# Patient Record
Sex: FEMALE
[De-identification: names, ages, dates, MRNs, and addresses within clinical notes are randomized per-mention and may not be internally consistent; named-entity substitution may affect disease eponyms.]

---

## 2021-05-18 ENCOUNTER — NON-APPOINTMENT (OUTPATIENT)
Age: 21
End: 2021-05-18

## 2021-05-18 ENCOUNTER — APPOINTMENT (OUTPATIENT)
Dept: OPHTHALMOLOGY | Facility: CLINIC | Age: 21
End: 2021-05-18
Payer: COMMERCIAL

## 2021-05-18 PROCEDURE — 99072 ADDL SUPL MATRL&STAF TM PHE: CPT

## 2021-05-18 PROCEDURE — 92004 COMPRE OPH EXAM NEW PT 1/>: CPT

## 2021-05-20 ENCOUNTER — EMERGENCY (EMERGENCY)
Facility: HOSPITAL | Age: 21
LOS: 1 days | Discharge: HOME | End: 2021-05-20
Attending: EMERGENCY MEDICINE
Payer: COMMERCIAL

## 2021-05-20 ENCOUNTER — APPOINTMENT (OUTPATIENT)
Dept: OPHTHALMOLOGY | Facility: CLINIC | Age: 21
End: 2021-05-20
Payer: COMMERCIAL

## 2021-05-20 ENCOUNTER — NON-APPOINTMENT (OUTPATIENT)
Age: 21
End: 2021-05-20

## 2021-05-20 VITALS
HEART RATE: 75 BPM | TEMPERATURE: 98 F | DIASTOLIC BLOOD PRESSURE: 78 MMHG | OXYGEN SATURATION: 100 % | SYSTOLIC BLOOD PRESSURE: 112 MMHG | RESPIRATION RATE: 15 BRPM | HEIGHT: 68 IN | WEIGHT: 220.02 LBS

## 2021-05-20 DIAGNOSIS — Z98.890 OTHER SPECIFIED POSTPROCEDURAL STATES: Chronic | ICD-10-CM

## 2021-05-20 PROBLEM — Z00.00 ENCOUNTER FOR PREVENTIVE HEALTH EXAMINATION: Status: ACTIVE | Noted: 2021-05-20

## 2021-05-20 LAB
ALBUMIN SERPL ELPH-MCNC: 4.5 G/DL — SIGNIFICANT CHANGE UP (ref 3.3–5)
ALP SERPL-CCNC: 106 U/L — SIGNIFICANT CHANGE UP (ref 40–120)
ALT FLD-CCNC: 12 U/L — SIGNIFICANT CHANGE UP (ref 10–45)
ANION GAP SERPL CALC-SCNC: 13 MMOL/L — SIGNIFICANT CHANGE UP (ref 5–17)
AST SERPL-CCNC: 15 U/L — SIGNIFICANT CHANGE UP (ref 10–40)
BASOPHILS # BLD AUTO: 0.05 K/UL — SIGNIFICANT CHANGE UP (ref 0–0.2)
BASOPHILS NFR BLD AUTO: 0.5 % — SIGNIFICANT CHANGE UP (ref 0–2)
BILIRUB SERPL-MCNC: 0.4 MG/DL — SIGNIFICANT CHANGE UP (ref 0.2–1.2)
BUN SERPL-MCNC: 12 MG/DL — SIGNIFICANT CHANGE UP (ref 7–23)
CALCIUM SERPL-MCNC: 9.8 MG/DL — SIGNIFICANT CHANGE UP (ref 8.4–10.5)
CHLORIDE SERPL-SCNC: 102 MMOL/L — SIGNIFICANT CHANGE UP (ref 96–108)
CO2 SERPL-SCNC: 22 MMOL/L — SIGNIFICANT CHANGE UP (ref 22–31)
CREAT SERPL-MCNC: 0.62 MG/DL — SIGNIFICANT CHANGE UP (ref 0.5–1.3)
EOSINOPHIL # BLD AUTO: 0.08 K/UL — SIGNIFICANT CHANGE UP (ref 0–0.5)
EOSINOPHIL NFR BLD AUTO: 0.7 % — SIGNIFICANT CHANGE UP (ref 0–6)
GLUCOSE SERPL-MCNC: 84 MG/DL — SIGNIFICANT CHANGE UP (ref 70–99)
HCG SERPL-ACNC: <2 MIU/ML — SIGNIFICANT CHANGE UP
HCT VFR BLD CALC: 42 % — SIGNIFICANT CHANGE UP (ref 34.5–45)
HGB BLD-MCNC: 14.1 G/DL — SIGNIFICANT CHANGE UP (ref 11.5–15.5)
IMM GRANULOCYTES NFR BLD AUTO: 0.4 % — SIGNIFICANT CHANGE UP (ref 0–1.5)
LYMPHOCYTES # BLD AUTO: 2.41 K/UL — SIGNIFICANT CHANGE UP (ref 1–3.3)
LYMPHOCYTES # BLD AUTO: 22 % — SIGNIFICANT CHANGE UP (ref 13–44)
MCHC RBC-ENTMCNC: 29.3 PG — SIGNIFICANT CHANGE UP (ref 27–34)
MCHC RBC-ENTMCNC: 33.6 GM/DL — SIGNIFICANT CHANGE UP (ref 32–36)
MCV RBC AUTO: 87.3 FL — SIGNIFICANT CHANGE UP (ref 80–100)
MONOCYTES # BLD AUTO: 0.63 K/UL — SIGNIFICANT CHANGE UP (ref 0–0.9)
MONOCYTES NFR BLD AUTO: 5.8 % — SIGNIFICANT CHANGE UP (ref 2–14)
NEUTROPHILS # BLD AUTO: 7.72 K/UL — HIGH (ref 1.8–7.4)
NEUTROPHILS NFR BLD AUTO: 70.6 % — SIGNIFICANT CHANGE UP (ref 43–77)
NRBC # BLD: 0 /100 WBCS — SIGNIFICANT CHANGE UP (ref 0–0)
PLATELET # BLD AUTO: 343 K/UL — SIGNIFICANT CHANGE UP (ref 150–400)
POTASSIUM SERPL-MCNC: 3.8 MMOL/L — SIGNIFICANT CHANGE UP (ref 3.5–5.3)
POTASSIUM SERPL-SCNC: 3.8 MMOL/L — SIGNIFICANT CHANGE UP (ref 3.5–5.3)
PROT SERPL-MCNC: 8.1 G/DL — SIGNIFICANT CHANGE UP (ref 6–8.3)
RBC # BLD: 4.81 M/UL — SIGNIFICANT CHANGE UP (ref 3.8–5.2)
RBC # FLD: 12.6 % — SIGNIFICANT CHANGE UP (ref 10.3–14.5)
SARS-COV-2 RNA SPEC QL NAA+PROBE: SIGNIFICANT CHANGE UP
SODIUM SERPL-SCNC: 137 MMOL/L — SIGNIFICANT CHANGE UP (ref 135–145)
WBC # BLD: 10.93 K/UL — HIGH (ref 3.8–10.5)
WBC # FLD AUTO: 10.93 K/UL — HIGH (ref 3.8–10.5)

## 2021-05-20 PROCEDURE — 99072 ADDL SUPL MATRL&STAF TM PHE: CPT

## 2021-05-20 PROCEDURE — 99215 OFFICE O/P EST HI 40 MIN: CPT

## 2021-05-20 PROCEDURE — 92133 CPTRZD OPH DX IMG PST SGM ON: CPT

## 2021-05-20 PROCEDURE — 99218: CPT

## 2021-05-20 PROCEDURE — 70450 CT HEAD/BRAIN W/O DYE: CPT | Mod: 26,MA

## 2021-05-20 PROCEDURE — 62270 DX LMBR SPI PNXR: CPT

## 2021-05-20 PROCEDURE — 92083 EXTENDED VISUAL FIELD XM: CPT

## 2021-05-20 RX ORDER — ACETAMINOPHEN 500 MG
650 TABLET ORAL ONCE
Refills: 0 | Status: COMPLETED | OUTPATIENT
Start: 2021-05-20 | End: 2021-05-20

## 2021-05-20 RX ADMIN — Medication 650 MILLIGRAM(S): at 21:18

## 2021-05-20 NOTE — ED CDU PROVIDER INITIAL DAY NOTE - PHYSICAL EXAMINATION
PHYSICAL EXAM:  	GENERAL: non-toxic appearing; in no respiratory distress  	HEAD Atraumatic, Normocephalic  	NECK: No JVD; FROM  	EYES:  EOMs intact b/l w/out deficits  	CHEST/LUNG: CTAB no wheezes/rhonchi/rales  	HEART: RRR no murmur/gallops/rubs  	ABDOMEN: +BS, soft, NT, ND  	EXTREMITIES: No LE edema, +2 radial pulses b/l  	MUSCULOSKELETAL: FROM of all 4 extremities  	NERVOUS SYSTEM:  A&Ox3, strength equal b/l UE and LE 5/5, sensation equal b/l UE and LE  PSYCH: Appropriate mood and affect

## 2021-05-20 NOTE — ED ADULT NURSE REASSESSMENT NOTE - NS ED NURSE REASSESS COMMENT FT1
Received report from Zulay CHRISTIANSON. Patient resting comfortably in stretcher. A&Ox4. Patient in no acute distress. Patient pending results of COVID swab and transport to CDU. Plan of care discussed. Safety and comfort measures maintained.

## 2021-05-20 NOTE — ED CDU PROVIDER INITIAL DAY NOTE - DETAILS
Papilledema   -neuro checks q4  -No LP at this time  -opthalmology following  -neuro to see after MRI  -MRI/MRV  -DW Arata, vitals every 4 hours, frequent reevaluations

## 2021-05-20 NOTE — ED CDU PROVIDER INITIAL DAY NOTE - NS ED ROS FT
Constitutional: No fever or chills  Eyes: +visual changes +photophobia   CV: No chest pain or lower extremity edema  Resp: No SOB no cough  GI: No abd pain. No nausea or vomiting. No diarrhea.   : No dysuria, hematuria.   MSK: No musculoskeletal pain  Skin: No rash  Psych: No complaints   Neuro: + headache. No numbness or tingling. No weakness.

## 2021-05-20 NOTE — ED PROVIDER NOTE - ATTENDING CONTRIBUTION TO CARE
Attending MD Haider: 21F with PMH/PSH including "surgery on my septum" ?septoplasty in 1/20/21 presents to the ED sent in by neuro ophthalmologist, Dr. Bernard, for IIH work up.  Reports that she has had almost a year of headaches.  Reports has had headaches for about 9-10 months.  Reports had surgery for septum in January thinking that part of the headache was coming from sinus pressure and chronic sinus issues.  Reports that Attending MD Haider: I personally have seen and examined this patient.  Resident note reviewed and agree on plan of care and except where noted.  See below for details.     Seen in [facility]    21F with PMH/PSH including "surgery on my septum" ?septoplasty in 1/2021, anemia, on Wellbutrin presents to the ED sent in by neuro ophthalmologist, Dr. Bernard, for IIH work up.  Reports that she has had almost a year of headaches.  Reports has had headaches for about 9-10 months.  Reports had surgery for septum in January thinking that part of the headache was coming from sinus pressure and chronic sinus issues.  Reports that after surgery felt some relief transiently.  Reports that the headache is diffuse but feels like her eyes are going to "pop out".  Reports headache is unchanged from what it has been over ~9 months.  Reports smokes THC which she reports helps but does not resolve headache.  Denies change in hearing, tinnitus, whooshing.  Denies pain with chewing.  Reports was seen by one ophthalmologist and told she had drusen and then saw a neuro ophthalmologist today who sent her in to the ED for papilledema.  Review of Ophtho notes reveals patient seen today by Dr. Bernard, patient sent to the ED for "does have some positive ROS for IIH; also overweight.  Will evaluate with MRI/MRV and LP for OP."  Denies acute vision loss.  Denies chest pain, shortness of breath, palpitations. Denies abdominal pain, nausea, vomiting, diarrhea, blood in stools. Denies numbness, weakness or tingling in extremities. Denies loss of urinary or bowel continence. Denies gait changes or difficulties.  A ten (10) point review of systems was negative other than as stated in the HPI or elsewhere in the chart.     Exam:   General: NAD  HENT: head NCAT, airway patent with moist mucous membranes  Eyes: pupils equal, dilated, round, no APD, EOMI, confrontational VF full, Va sc OD   OS  , no periorbital ecchymosis, no tenderness to palpation of orbital rim, lid margins clear, no retained foreign body on lid eversion, no conjunctival injection, no corneal defect, fundus exam limited, +papilledema OU  Lungs: lungs CTAB with good inspiratory effort, no wheezing, no rhonchi, no rales  Cardiac: +S1S2, no m/r/g  GI: abdomen soft with +BS, NT, ND  : no CVAT  MSK: FROM at neck, no tenderness to midline palpation, no stepoffs along length of spine, no calf tenderness, swelling, erythema or warmth  Neuro: CN 2-12 grossly intact, moving all extremities with 5/5 strength bilateral upper and lower extremities, sensory grossly intact, no gross neuro deficits  Psych: normal mood and affect     A/P: 21F with papilledema OU in setting of headache for 9 months, will discuss with ophthalmology, will obtain CT head to rule out mass effect, will obtain labs for metabolic derangement, will COVID swab

## 2021-05-20 NOTE — ED CDU PROVIDER INITIAL DAY NOTE - ATTENDING CONTRIBUTION TO CARE
Attending MD Haider:   I personally have seen and examined this patient.  Physician assistant note reviewed and agree on plan of care and except where noted.  See below for details.     Seen in [facility] 50    21F with PMH/PSH including "surgery on my septum" ?septoplasty in 1/2021, anemia, on Wellbutrin presents to the ED sent in by neuro ophthalmologist, Dr. Bernard, for IIH work up.  Reports that she has had almost a year of headaches.  Reports has had headaches for about 9-10 months.  Reports had surgery for septum in January thinking that part of the headache was coming from sinus pressure and chronic sinus issues.  Reports that after surgery felt some relief transiently.  Reports that the headache is diffuse but feels like her eyes are going to "pop out".  Reports headache is unchanged from what it has been over ~9 months.  Reports smokes THC which she reports helps but does not resolve headache.  Denies change in hearing, tinnitus, whooshing.  Denies pain with chewing.  Reports was seen by one ophthalmologist and told she had drusen and then saw a neuro ophthalmologist today who sent her in to the ED for papilledema.  Review of Ophtho notes reveals patient seen today by Dr. Bernard, patient sent to the ED for "does have some positive ROS for IIH; also overweight.  Will evaluate with MRI/MRV and LP for OP."  Denies acute vision loss.  Denies chest pain, shortness of breath, palpitations. Denies abdominal pain, nausea, vomiting, diarrhea, blood in stools. Denies numbness, weakness or tingling in extremities. Denies loss of urinary or bowel continence. Denies gait changes or difficulties.  A ten (10) point review of systems was negative other than as stated in the HPI or elsewhere in the chart.     Exam:   General: NAD  HENT: head NCAT, airway patent with moist mucous membranes  Eyes: pupils equal, dilated, round, no APD, EOMI, confrontational VF full, Va sc OD   OS  , no periorbital ecchymosis, no tenderness to palpation of orbital rim, lid margins clear, no retained foreign body on lid eversion, no conjunctival injection, no corneal defect, fundus exam limited, +papilledema OU  Lungs: lungs CTAB with good inspiratory effort, no wheezing, no rhonchi, no rales  Cardiac: +S1S2, no m/r/g  GI: abdomen soft with +BS, NT, ND  : no CVAT  MSK: FROM at neck, no tenderness to midline palpation, no stepoffs along length of spine, no calf tenderness, swelling, erythema or warmth  Neuro: CN 2-12 grossly intact, moving all extremities with 5/5 strength bilateral upper and lower extremities, sensory grossly intact, no gross neuro deficits  Psych: normal mood and affect     A/P: 21F with papilledema OU in setting of headache for 9 months, MRI/MRV, neuro checks, +/- neuro

## 2021-05-20 NOTE — CHART NOTE - NSCHARTNOTEFT_GEN_A_CORE
Pt sent by Dr. Jessica Bernard ([facility] Neuro-ophthalmology) to [facility] ER for MRI/MRV and Lumbar Puncture measurement of Opening Pressure to evaluation for papilledema noted in both eyes on exam today. Relevant information from her outpatient note copied below:      HPI: Reports she has had headaches since high school but increased in the  past 8 months, tension type with retro orbital pain; denies any nausea, vomiting, does have phonophobia and  photo- phobia. Denies any blurry vision or TVOs, no ringing or whooshing in her ears. Denies any weight gain  (she is overweight though), new medications, face creams, wash or lotions. No medications for acne or  antibiotics    VA OD: Dcc20/20. OS: Dcc20/25+2.  IOP: iCare OD: 22 OS: 23 2:19 PM  AR Dry  Sph Cyl [place] PD  OD -2.75 +1.00 090 63mm  OS -3.00 +0.75 078  Dilation:  Location: OU. Tech: JESSICA BERNARD. Time: 2:34 PM. Drops: Tropicamide 1%; Proparacaine 0.5%.  Keratometry:  OD: K1 42.75 @ 178 K2 43.75 @ 88 Asti OS: K1 43.25 @ 170 K2 44.25 @ 80 Asti  Color Plates: Findings OD: 15 out of 15 Plates Correct. Findings OS: 15 out of 15 Plates Correct.     Posterior Right Eye Left Eye  • General  • Nerve CDR <0.1. disc elevation vs edema nasally  OS>OD.  CDR <0.1. disc elevation vs edema nasally  OS>OD.  • Vitreous Clear. Clear.  • Retinal Vessels Normal. Normal.  • Macula Normal Macula. Normal Macula.     HVF 24-2: Findings OD: Reason For Testing: Initial Evaluation. Likely Full. Low False Positives/Negatives. Good  Reliability. Good Fixation. Findings OS: Reason For Testing: Initial Evaluation. Likely Full. Low False  Positives/Negatives. Good Reliability. Good Fixation.  OCT RNFL: Findings OD: Reason for Testing: Guide Treatment. Norm      Imp/Plan:  1. Papilledema OU. Does have some Positive ROS for IIH; also overweight. Will evaluate with MRI/MRV and LP for  OP. I advised patient to report to [facility] ER for further evaluation    Dr. Jessica Bernard  [address] [place] [address]  [phone]  or   600 [facility] [address]  [place] [address]       Ankur Pinon, PGY-III  JUAN MANUEL Pager: [phone] - please page full 10 digit number  [facility] Pager: [phone] - please page full 10 digit number  Available on Microsoft Teams for HIPAA compliant messaging Pt sent by Dr. Jessica Bernard ([facility] Neuro-ophthalmology) to [facility] ER for MRI/MRV and Lumbar Puncture measurement of Opening Pressure in addition to sending CSF studies to evaluate for papilledema noted in both eyes on exam today. Relevant information from her outpatient note copied below:      HPI: Reports she has had headaches since high school but increased in the  past 8 months, tension type with retro orbital pain; denies any nausea, vomiting, does have phonophobia and  photo- phobia. Denies any blurry vision or TVOs, no ringing or whooshing in her ears. Denies any weight gain  (she is overweight though), new medications, face creams, wash or lotions. No medications for acne or  antibiotics    VA OD: Dcc20/20. OS: Dcc20/25+2.  IOP: iCare OD: 22 OS: 23 2:19 PM  AR Dry  Sph Cyl [place] PD  OD -2.75 +1.00 090 63mm  OS -3.00 +0.75 078  Dilation:  Location: OU. Tech: JESSICA BERNARD. Time: 2:34 PM. Drops: Tropicamide 1%; Proparacaine 0.5%.  Keratometry:  OD: K1 42.75 @ 178 K2 43.75 @ 88 Asti OS: K1 43.25 @ 170 K2 44.25 @ 80 Asti  Color Plates: Findings OD: 15 out of 15 Plates Correct. Findings OS: 15 out of 15 Plates Correct.     Posterior Right Eye Left Eye  • General  • Nerve CDR <0.1. disc elevation vs edema nasally  OS>OD.  CDR <0.1. disc elevation vs edema nasally  OS>OD.  • Vitreous Clear. Clear.  • Retinal Vessels Normal. Normal.  • Macula Normal Macula. Normal Macula.     HVF 24-2: Findings OD: Reason For Testing: Initial Evaluation. Likely Full. Low False Positives/Negatives. Good  Reliability. Good Fixation. Findings OS: Reason For Testing: Initial Evaluation. Likely Full. Low False  Positives/Negatives. Good Reliability. Good Fixation.  OCT RNFL: Findings OD: Reason for Testing: Guide Treatment. Norm      Imp/Plan:  1. Papilledema OU. Does have some Positive ROS for IIH; also overweight. Will evaluate with MRI/MRV and LP for  OP/CSF studies. I advised patient to report to [facility] ER for further evaluation    Dr. Jessica Bernard  [address] [place] [address], [place] [address]  [phone]  or   [address] [address]       Ankur Pinon, PGY-III  JUAN MANUEL Pager: [phone] - please page full 10 digit number  [facility] Pager: [phone] - please page full 10 digit number  Available on Microsoft Teams for HIPAA compliant messaging

## 2021-05-20 NOTE — ED CDU PROVIDER INITIAL DAY NOTE - OBJECTIVE STATEMENT
20 yo F with PMH ?septoplasty in 1/2021, dysgraphia, obese, presents to the ED c/o about 10-11 months of worsening headaches. States that she has had headaches since high school but they have been becoming worse. Headaches are described as pain behind her eyes b/l.  States that 2 days ago she woke up and due to photophobia, covered her eyes with a tube sock. Afterwards she had an episode of blurred vision which resolved spontaneously. Pt saw a neuro-opthalmologist today and it was found that she had papilledema and was sent to ED for further eval. Pt states the headache has not changed in character. She denies loss of vision or eye pain. Denies n/v/d, abd pain, cp, fever, neck pain. Former tobacco smoker. Occasional marijuana use. No PMD. No neurologist.   ED course: CT head negative for acute pathology. LP discussed and considered although was not found to be needed emergently. Plan for MRI/MRV in CDU. Opthalmology following. Neuro consult pending MRI/MRV. 22 yo F with PMH ?septoplasty in 1/2021, dysgraphia, obese, presents to the ED c/o about 10-11 months of worsening headaches and blurry vision. States that she has had headaches since high school but they have been becoming worse. Headaches are described as pain behind her eyes b/l. She also says that she has had blurry vision for months. States that 2 days ago she woke up and due to photophobia, covered her eyes with a tube sock. Afterwards she had an episode where she says that she couldn't seen, resolving on its own. Pt saw a neuro-opthalmologist today and it was found that she had papilledema and was sent to ED for further eval. Pt states the headache has not changed in character. She denies loss of vision or eye pain. Denies n/v/d, abd pain, cp, fever, neck pain. Former tobacco smoker. Occasional marijuana use. No PMD. No neurologist.   ED course: CT head negative for acute pathology. LP discussed and considered although was not found to be needed emergently. Plan for MRI/MRV in CDU. Opthalmology following. Neuro consult pending MRI/MRV.

## 2021-05-20 NOTE — ED PROVIDER NOTE - OBJECTIVE STATEMENT
22 yo F with nosignificant PMHx, obese, presents to the ED c/o about 10-11 months of headaches, blurred vision, and photophobia. About 2 days ago, due to photophobia, pt covered her eyes with a tube sock and afterwards, had an episode of blurred vision which resolved spontaneously. Pt saw a neuro-opthalmologist today and it was found that she had papilledema and was sent to ED for further eval. Pt states the headache has not changed in character. She denies loss of vision or eye pain. Denies n/v/d, abd pain, cp, fever, neck pain.

## 2021-05-20 NOTE — ED PROVIDER NOTE - PROGRESS NOTE DETAILS
Attending MD Haider: Spoke with ophthalmology, explained that these symptoms have been going on for 9 months and vision 20/20 and 20/25 right now.  Requested MRI/MRV to rule out dural sinus thrombosis and LP.  Will consult neuro for LP as not emergent with persistent symptoms for 9 months.  Gave resident direct attending number to discuss with Dr. Bernard.  Neuro paged. Attending MD Haider: Spoke with Marco Antonio, explained above.  Reports difficult to schedule an LP as an outpatient.  Offered IR number, reports has attempted in past with difficulty.  Explained that today patient will have CT head and will be sent to CDU for MRI/MRV but no emergent LP at this time. Attending MD Haider: Spoke with neuro, reports can be called after MRI/MRV results but not available for non emergent LP overnight. Attending MD Haider: Results and plan discussed with patient, amenable.  Will send to CDU for MRI/MRV.  Once MRs read, follow up with neuro.

## 2021-05-20 NOTE — ED PROVIDER NOTE - PHYSICAL EXAMINATION
PHYSICAL EXAM:  GENERAL: non-toxic appearing; in no respiratory distress  HEAD Atraumatic, Normocephalic  NECK: No JVD; FROM  EYES: PERRL, EOMs intact b/l w/out deficits; normal conjunctiva; b/l papilledema   CHEST/LUNG: CTAB no wheezes/rhonchi/rales  HEART: RRR no murmur/gallops/rubs  ABDOMEN: +BS, soft, NT, ND  EXTREMITIES: No LE edema, +2 radial pulses b/l, +2 DP/PT pulses b/l  MUSCULOSKELETAL: FROM of all 4 extremities  NERVOUS SYSTEM:  A&Ox3, No motor deficits or sensory deficits; CNII-XII intact; no focal neurologic deficits  SKIN:  No new rashes PHYSICAL EXAM:  GENERAL: non-toxic appearing; in no respiratory distress  HEAD Atraumatic, Normocephalic  NECK: No JVD; FROM  EYES: PERRL, EOMs intact b/l w/out deficits; normal conjunctiva; b/l papilledema; b/l visual acuity 20/20  CHEST/LUNG: CTAB no wheezes/rhonchi/rales  HEART: RRR no murmur/gallops/rubs  ABDOMEN: +BS, soft, NT, ND  EXTREMITIES: No LE edema, +2 radial pulses b/l, +2 DP/PT pulses b/l  MUSCULOSKELETAL: FROM of all 4 extremities  NERVOUS SYSTEM:  A&Ox3, No motor deficits or sensory deficits; CNII-XII intact; no focal neurologic deficits  SKIN:  No new rashes

## 2021-05-20 NOTE — ED PROVIDER NOTE - NS ED ROS FT
Constitutional: no fevers; no chills  HEENT: visual changes, no sore throat, no rhinorrhea  CV: no cp; no palpitations  Resp: no sob; no cough  GI: no abd pain, no nausea, no vomiting, no diarrhea, no constipation  : no dysuria, no hematuria  MSK: no myalgais; no arthralgias  skin: no rashes  neuro: HA, no numbness; no weakness, no tingling  ROS statement: all other ROS negative except as per HPI

## 2021-05-20 NOTE — ED PROVIDER NOTE - CLINICAL SUMMARY MEDICAL DECISION MAKING FREE TEXT BOX
Logan Hill MD. pt with 10-11 months of headache, blurred vision, photophobia, unchanged in nature except that it acutely worsened and improved 2 days ago. pt saw neuro-ophthalmologist and was evidence of papilledema and was sent to ED for further eval. pt neurologically intact at this time altho there is papiledema. will discuss w/ opthho, reassess.

## 2021-05-20 NOTE — ED ADULT NURSE NOTE - OBJECTIVE STATEMENT
pt here for papiledema.  she went to a md today and was referred to the er.  c/o headache and had visual changes where her vision was  "dark"  neuro is without deficit pt here for papiledema.  she went to a md today and was referred to the er.  c/o headache and had visual changes where her vision was  "dark"  neuro is without deficit  pts pupils are dilated by md appointment today

## 2021-05-21 VITALS
HEART RATE: 98 BPM | TEMPERATURE: 98 F | RESPIRATION RATE: 18 BRPM | OXYGEN SATURATION: 97 % | SYSTOLIC BLOOD PRESSURE: 107 MMHG | DIASTOLIC BLOOD PRESSURE: 73 MMHG

## 2021-05-21 LAB
APPEARANCE CSF: CLEAR — SIGNIFICANT CHANGE UP
COLOR CSF: SIGNIFICANT CHANGE UP
GLUCOSE CSF-MCNC: 57 MG/DL — SIGNIFICANT CHANGE UP (ref 40–70)
GRAM STN FLD: SIGNIFICANT CHANGE UP
INR BLD: 1.08 RATIO — SIGNIFICANT CHANGE UP (ref 0.88–1.16)
NEUTROPHILS # CSF: SIGNIFICANT CHANGE UP % (ref 0–6)
NRBC NFR CSF: <1 — SIGNIFICANT CHANGE UP (ref 0–5)
PROT CSF-MCNC: 23 MG/DL — SIGNIFICANT CHANGE UP (ref 15–45)
PROTHROM AB SERPL-ACNC: 12.9 SEC — SIGNIFICANT CHANGE UP (ref 10.6–13.6)
RBC # CSF: 0 /UL — SIGNIFICANT CHANGE UP (ref 0–0)
SPECIMEN SOURCE: SIGNIFICANT CHANGE UP
TUBE TYPE: SIGNIFICANT CHANGE UP

## 2021-05-21 PROCEDURE — 85025 COMPLETE CBC W/AUTO DIFF WBC: CPT

## 2021-05-21 PROCEDURE — 84157 ASSAY OF PROTEIN OTHER: CPT

## 2021-05-21 PROCEDURE — 62328 DX LMBR SPI PNXR W/FLUOR/CT: CPT

## 2021-05-21 PROCEDURE — 84702 CHORIONIC GONADOTROPIN TEST: CPT

## 2021-05-21 PROCEDURE — 89051 BODY FLUID CELL COUNT: CPT

## 2021-05-21 PROCEDURE — 87205 SMEAR GRAM STAIN: CPT

## 2021-05-21 PROCEDURE — A9585: CPT

## 2021-05-21 PROCEDURE — G0378: CPT

## 2021-05-21 PROCEDURE — 99217: CPT

## 2021-05-21 PROCEDURE — 80053 COMPREHEN METABOLIC PANEL: CPT

## 2021-05-21 PROCEDURE — 82945 GLUCOSE OTHER FLUID: CPT

## 2021-05-21 PROCEDURE — U0003: CPT

## 2021-05-21 PROCEDURE — 70546 MR ANGIOGRAPH HEAD W/O&W/DYE: CPT | Mod: 26

## 2021-05-21 PROCEDURE — U0005: CPT

## 2021-05-21 PROCEDURE — 70551 MRI BRAIN STEM W/O DYE: CPT | Mod: 26,MA,59

## 2021-05-21 PROCEDURE — 99284 EMERGENCY DEPT VISIT MOD MDM: CPT | Mod: GC

## 2021-05-21 PROCEDURE — 87070 CULTURE OTHR SPECIMN AEROBIC: CPT

## 2021-05-21 PROCEDURE — 70551 MRI BRAIN STEM W/O DYE: CPT

## 2021-05-21 PROCEDURE — 70450 CT HEAD/BRAIN W/O DYE: CPT

## 2021-05-21 PROCEDURE — 70545 MR ANGIOGRAPHY HEAD W/DYE: CPT

## 2021-05-21 PROCEDURE — 85610 PROTHROMBIN TIME: CPT

## 2021-05-21 PROCEDURE — 70546 MR ANGIOGRAPH HEAD W/O&W/DYE: CPT

## 2021-05-21 PROCEDURE — 96374 THER/PROPH/DIAG INJ IV PUSH: CPT | Mod: XU

## 2021-05-21 PROCEDURE — 99284 EMERGENCY DEPT VISIT MOD MDM: CPT | Mod: 25

## 2021-05-21 RX ORDER — DIAZEPAM 5 MG
5 TABLET ORAL ONCE
Refills: 0 | Status: DISCONTINUED | OUTPATIENT
Start: 2021-05-21 | End: 2021-05-21

## 2021-05-21 RX ORDER — METOCLOPRAMIDE HCL 10 MG
10 TABLET ORAL ONCE
Refills: 0 | Status: COMPLETED | OUTPATIENT
Start: 2021-05-21 | End: 2021-05-21

## 2021-05-21 RX ORDER — LANOLIN ALCOHOL/MO/W.PET/CERES
5 CREAM (GRAM) TOPICAL AT BEDTIME
Refills: 0 | Status: DISCONTINUED | OUTPATIENT
Start: 2021-05-21 | End: 2021-05-24

## 2021-05-21 RX ORDER — ACETAZOLAMIDE 250 MG/1
2 TABLET ORAL
Qty: 120 | Refills: 0
Start: 2021-05-21 | End: 2021-06-19

## 2021-05-21 RX ADMIN — Medication 650 MILLIGRAM(S): at 00:20

## 2021-05-21 RX ADMIN — Medication 5 MILLIGRAM(S): at 11:24

## 2021-05-21 RX ADMIN — Medication 5 MILLIGRAM(S): at 00:36

## 2021-05-21 RX ADMIN — Medication 10 MILLIGRAM(S): at 00:36

## 2021-05-21 NOTE — ED CDU PROVIDER SUBSEQUENT DAY NOTE - PHYSICAL EXAMINATION
PHYSICAL EXAM:  	GENERAL: non-toxic appearing; in no respiratory distress  	HEAD Atraumatic, Normocephalic  	NECK: No JVD; FROM  	EYES: PERRL, EOMs intact b/l w/out deficits  	CHEST/LUNG: CTAB no wheezes/rhonchi/rales  	HEART: RRR no murmur/gallops/rubs  	ABDOMEN: +BS, soft, NT, ND  	EXTREMITIES: No LE edema, +2 radial pulses b/l  	MUSCULOSKELETAL: FROM of all 4 extremities  	NERVOUS SYSTEM:  A&Ox3, strength equal b/l UE and LE 5/5, sensation equal b/l UE and LE  PSYCH: Appropriate mood and affect

## 2021-05-21 NOTE — CONSULT NOTE ADULT - SUBJECTIVE AND OBJECTIVE BOX
ONIEL ARBLOEDA  Female  MRN-[id number]    HPI:    21F w/ no PMH presents w/ headaches and blurry vision.     Patient states shes been having dull retroorbital headaches since high school but have worsened in the past 8 months. Headaches worse when laying down and better when sitting up. Occasionally, she will lose vision when bending over and straining. No ringing in either ear, though in high school she used to get "whooshing" in her ears. No weight gain. No new meds, face creams, or abx.     She saw Dr. Clarke in clinic where she was found to have papilledema. Sent in for further workup.     MR brain w/w/o contrast negative  MRV w/ IV contrast demonstrates > transverse sinus dominance; L. transverse sinus/sigmoid sinus arachnoid granulation.     NIHSS: 0  MRS: 0    ROS: All negative except as mentioned in HPI    PAST MEDICAL & SURGICAL HISTORY:  Dysgraphia    S/P surgery on nasal septum      MEDICATIONS  (STANDING):  melatonin 5 milliGRAM(s) Oral at bedtime    MEDICATIONS  (PRN):    Allergies    No Known Allergies    Intolerances        VITAL SIGNS:  T(F): 98  HR: 71  BP: 113/76  RR: 16  SpO2: 98%    Exam:   Gen: Well appearing  Skin: no rash  Ext: No edema   CV: RRR   MS: Oriented x3. Fluent. Follows crossed commands.   CN: 20/20 OD, 20/25 OS, pupils 5mm OU reactive. Mild disc edema OU. EOMI. V1-V3 intact. Face symmetric. T/u midline.   Motor: Full strength throughout.   Sensory: Intact to LT throughout   Reflexes: 2+ throughout. Babinski absent bilaterally.   Coordination: No dysmetria on FNF or ataxia on HTS bilaterally   Gait: Deferred    LABS:                          14.1   10.93 )-----------( 343      ( 20 May 2021 18:42 )             42.0     05-20    137  |  102  |  12  ----------------------------<  84  3.8   |  22  |  0.62    Ca    9.8      20 May 2021 18:42    TPro  8.1  /  Alb  4.5  /  TBili  0.4  /  DBili  x   /  AST  15  /  ALT  12  /  AlkPhos  106  05-20    PT/INR - ( 21 May 2021 10:13 )   PT: 12.9 sec;   INR: 1.08 ratio             RADIOLOGY & ADDITIONAL STUDIES:             ONIEL ARBOLEDA  Female  MRN-[id number]    HPI:    21F w/ no PMH presents w/ headaches and blurry vision.     Patient states shes been having dull retroorbital headaches since high school but have worsened in the past 8 months. Headaches worse when laying down and better when sitting up. Occasionally, she will lose vision when bending over and straining. No ringing in either ear, though in high school she used to get "whooshing" in her ears. No weight gain. No new meds, face creams, or abx.     She saw Dr. Clarke in clinic where she was found to have papilledema. Sent in for further workup.     MR brain w/w/o contrast negative  MRV w/ IV contrast demonstrates > transverse sinus dominance; L. transverse sinus/sigmoid sinus arachnoid granulation.     NIHSS: 0  MRS: 0    ROS: All negative except as mentioned in HPI    PAST MEDICAL & SURGICAL HISTORY:  Dysgraphia    S/P surgery on nasal septum      MEDICATIONS  (STANDING):  melatonin 5 milliGRAM(s) Oral at bedtime    MEDICATIONS  (PRN):    Allergies    No Known Allergies    Intolerances        VITAL SIGNS:  T(F): 98  HR: 71  BP: 113/76  RR: 16  SpO2: 98%    Exam:   Gen: Well appearing  Skin: no rash  Ext: No edema   CV: RRR   MS: Oriented x3. Fluent. Follows crossed commands.   CN: 20/20 OD, 20/25 OS, pupils 5mm OU reactive. Mild disc edema OU. EOMI. V1-V3 intact. Face symmetric. T/u midline.   Motor: Full strength throughout.   Sensory: Intact to LT throughout   Reflexes: 2+ throughout. Babinski absent bilaterally.   Coordination: No dysmetria on FNF or ataxia on HTS bilaterally   Gait: Deferred    LABS:                          14.1   10.93 )-----------( 343      ( 20 May 2021 18:42 )             42.0     05-20    137  |  102  |  12  ----------------------------<  84  3.8   |  22  |  0.62    Ca    9.8      20 May 2021 18:42    TPro  8.1  /  Alb  4.5  /  TBili  0.4  /  DBili  x   /  AST  15  /  ALT  12  /  AlkPhos  106  05-20    PT/INR - ( 21 May 2021 10:13 )   PT: 12.9 sec;   INR: 1.08 ratio             RADIOLOGY & ADDITIONAL STUDIES:

## 2021-05-21 NOTE — CONSULT NOTE ADULT - ATTENDING COMMENTS
20 y/o woman, obese, p/w worsening HA and light sensitivity. Pt endorses headache since teenage years, but worse in the last 8 months. No pulsatile tinnitus, but worse after waking up and better after sitting up, some vision blurriness after bending down. On exam she has mild Grade I bilateral papilledema. LP under IR measured OP at 24, which given her hx would consider to be elevated.   Agree with Plan as above. Plan d/w patient.

## 2021-05-21 NOTE — ED CDU PROVIDER SUBSEQUENT DAY NOTE - ATTENDING CONTRIBUTION TO CARE
Attending MD Zee:   I personally have seen and examined this patient.  Physician assistant note reviewed and agree on plan of care and except where noted.  See HPI, PE, and MDM for details.

## 2021-05-21 NOTE — ED CDU PROVIDER DISPOSITION NOTE - PATIENT PORTAL LINK FT
You can access the FollowMyHealth Patient Portal offered by [facility] by registering at the following website: [email or web address]. By joining [employer]’s FollowMyHealth portal, you will also be able to view your health information using other applications (apps) compatible with our system.

## 2021-05-21 NOTE — ED CDU PROVIDER SUBSEQUENT DAY NOTE - PROGRESS NOTE DETAILS
Patient seen at bedside. VSS. No acute events on telemetry. No complaints. Patient resting comfortably, endorses improvement in headache. Neuro check: alert and oriented, EOMI, PERRL, sensation equal b/l UE and LE, strength 5/5 equal b/l UE and LE. States that she has blurry vision but nothing worse than her baseline. Will reevaluate. - Myra Delacruz PA-C I have personally performed a face to face diagnostic evaluation on this patient.  I have reviewed the ACP note and agree with the history, exam, and plan of care, except as noted.  History and Exam by me shows       Pt observed overnight for evaluation of acute on chronic retroorbital headache pain x months and papilledema identified on outpatient neuro-optho examination. Pending MR/MRV to r/o CVST. If MR unrevealing, plan for rule out IIH with LP. Pt body habitus deemed too challenged for this clinician to perform landmark-based LP thus flouro-guided LP ordered and hopefully to be done today. Patient pending neuro consultation as well. I have personally performed a face to face diagnostic evaluation on this patient.  I have reviewed the ACP note and agree with the history, exam, and plan of care, except as noted.  History and Exam by me shows       Pt observed overnight for evaluation of acute on chronic retroorbital headache pain x months and papilledema identified on outpatient neuro-optho examination. Pending MR/MRV to r/o CVST. If MR unrevealing, plan for rule out IIH with LP. Pt body habitus deemed too challenging for this clinician to perform landmark-based LP thus flouro-guided LP ordered and hopefully to be done today. Patient pending neuro consultation as well. Pt comfortable. No complaints. Vital signs stable. MRI normal. pt to have LP under fluoro today. seen by neurology team. they will follow LP results. -Jolynn Frank PA-C Pt comfortable. No complaints. Vital signs stable. Will continue to observe and reassess. Awaiting MRI/MRA/MRV and neurology consult. -Jolynn Frank PA-C Pt had LP done. opening pressure 24. csf studies normal. seen by neurology team, recommended starting diamox [id number] and outpt followup. discussed with Dr. Haider. pt stable for d/c home. -Jolynn Frank PA-C

## 2021-05-21 NOTE — ED CDU PROVIDER DISPOSITION NOTE - NSFOLLOWUPINSTRUCTIONS_ED_ALL_ED_FT
1. Rest. Stay hydrated.   2. Continue your current home medications.  3. Follow-up with your medical doctor in 2-3 days.  Bring your results with you for follow-up.  4. Return to the ER if you have any new or worsening symptoms, chest pain, difficulty breathing, fevers, chills, weakness, or any other concerns. 1. Rest. Stay hydrated.   2. Continue your current home medications. Start Diamox 500mg two times per day.   3. Follow-up with Neurovascular specialist Dr. Dharmesh Campos at [phone] within 1 month of discharge and follow-up with General Neurology at [phone] within 2 weeks of discharge.  4. Follow-up with your medical doctor in 2-3 days.  Bring your results with you for follow-up.  Follow up with your neuro-ophthalmologist as well in the next few days.  5. Return to the ER if you have any new or worsening symptoms, chest pain, difficulty breathing, fevers, chills, weakness, worsening headaches, or any other concerns.

## 2021-05-21 NOTE — ED CDU PROVIDER DISPOSITION NOTE - ATTENDING CONTRIBUTION TO CARE
Attending MD Zee:   I personally have seen and examined this patient.  Physician assistant note reviewed and agree on plan of care and except where noted.  See HPI, PE, and MDM for details. Attending MD Zee:   I personally have seen and examined this patient.  Physician assistant note reviewed and agree on plan of care and except where noted.  See HPI, PE, and MDM for details.    Attending MD Haider:   I personally have seen and examined this patient.  Physician assistant note reviewed and agree on plan of care and except where noted.  See below for details.     Seen in [facility]    21F with PMH/PSH including "surgery on my septum" ?septoplasty in 1/2021, anemia, on Wellbutrin sent to the CDU after presenting to the ED with papilledema.  This MD was sending MD, patient known to me.  Patient reports feels markedly improved.  Reports marked improvement of headache after LP.  Denies loss of vision, double vision.  Denies nausea, vomiting.  Denies chest pains, shortness of breath, abdominal pain.  Denies numbness, weakness or tingling in extremities. Reports felt tingling in her leg during LP but reports was transient and resolved immediately during LP.  Patient was signed out to this MD pending gram stain from LP.  Gram stain nonactionable.  Labs and imaging reviewed and reviewed with patient.  Reviewed neuro recs fro Diamox and outpatient follow up.      Exam:   General: NAD  HENT: head NCAT, airway patent with moist mucous membranes  Eyes: pupils equal, dilated, round, no APD, EOMI  Chest: symmetric chest rise, no increased work of breathing  GI: abdomen soft with +BS, NT  MSK: FROM at neck, no tenderness to midline palpation  Neuro: CN 2-12 grossly intact, moving all extremities with 5/5 strength bilateral upper and lower extremities, sensory grossly intact, no gross neuro deficits  Psych: normal mood and affect     A/P: 21F with papilledema OU, patient to follow up with neuro and ophtho, start Diamox.  STable for discharge. Follow up instructions given, importance of follow up emphasized, return to ED parameters reviewed and patient verbalized understanding.  All questions answered, all concerns addressed.

## 2021-05-21 NOTE — ED ADULT NURSE REASSESSMENT NOTE - COMFORT CARE
ambulated to bathroom/darkened lights/plan of care explained/po fluids offered/repositioned/warm blanket provided aggression/sadness/suicidal ideation

## 2021-05-21 NOTE — ED CDU PROVIDER SUBSEQUENT DAY NOTE - HISTORY
No interval changes since initial CDU provider note. Patient appears very comfortable in bed, although endorses headache (overall improving after Tylenol). Offered Reglan, patient accepted. NAD VSS. no events on tele. Neuro exam unchanged. Plan for MRIs today in the setting of papilledema. Opthalmology following. - MARÍA Delacruz

## 2021-05-21 NOTE — PRE PROCEDURE NOTE - PRE PROCEDURE EVALUATION
Interventional Radiology    HPI: 21y Female with c/o headaches, transient visual obscuration and OU papilledema consistent w/ increase ICP proccess, likely intracranial hypertension (i.e pseudotumor cerebri).     Pt referred to neurorad for LP.    PAST MEDICAL & SURGICAL HISTORY:  Dysgraphia    S/P surgery on nasal septum      Allergies    No Known Allergies    Intolerances        Medications (Abx/Cardiac/Anticoagulation/Blood Products)      Data:  172.7  99.8  T(C): 36.7  HR: 71  BP: 113/76  RR: 16  SpO2: 98%    Exam  General: No acute distress    -WBC 10.93 / HgB 14.1 / Hct 42.0 / Plt 343  -Na 137 / Cl 102 / BUN 12 / Glucose 84  -K 3.8 / CO2 22 / Cr 0.62  -ALT 12 / Alk Phos 106 / T.Bili 0.4  -INR1.08    Labs:                        14.1   10.93 )-----------( 343      ( 20 May 2021 18:42 )             42.0     05-20    137  |  102  |  12  ----------------------------<  84  3.8   |  22  |  0.62    Ca    9.8      20 May 2021 18:42    TPro  8.1  /  Alb  4.5  /  TBili  0.4  /  DBili  x   /  AST  15  /  ALT  12  /  AlkPhos  106  05-20    PT/INR - ( 21 May 2021 10:13 )   PT: 12.9 sec;   INR: 1.08 ratio        HCG Quantitative, Serum (05.20.21 @ 18:42)    HCG Quantitative, Serum: <2.0:       Asssessment/Plan: 21y Female presents for fluoroscopically guided lumbar puncture.    -Risks/Benefits/alternatives explained with the patient and/or healthcare proxy and witnessed informed consent obtained. Risks including bleeding, infection, nerve damage, headaches were discussed.    LENA Chavez  [facility] [id number]  Ext [phone]

## 2021-05-21 NOTE — ED CDU PROVIDER DISPOSITION NOTE - CLINICAL COURSE
20 yo F with PMH ?septoplasty in 1/2021, dysgraphia, obese, presents to the ED c/o about 10-11 months of worsening headaches. States that she has had headaches since high school but they have been becoming worse. Headaches are described as pain behind her eyes b/l.  States that 2 days ago she woke up and due to photophobia, covered her eyes with a tube sock. Afterwards she had an episode of blurred vision which resolved spontaneously. Pt saw a neuro-opthalmologist today and it was found that she had papilledema and was sent to ED for further eval. Pt states the headache has not changed in character. She denies loss of vision or eye pain. Denies n/v/d, abd pain, cp, fever, neck pain. Former tobacco smoker. Occasional marijuana use. No PMD. No neurologist.   ED course: CT head negative for acute pathology. LP discussed and considered although was not found to be needed emergently. Plan for MRI/MRV in CDU. Opthalmology following. Neuro consult pending MRI/MRV. 22 yo F with PMH ?septoplasty in 1/2021, dysgraphia, obese, presents to the ED c/o about 10-11 months of worsening headaches. States that she has had headaches since high school but they have been becoming worse. Headaches are described as pain behind her eyes b/l.  States that 2 days ago she woke up and due to photophobia, covered her eyes with a tube sock. Afterwards she had an episode of blurred vision which resolved spontaneously. Pt saw a neuro-opthalmologist today and it was found that she had papilledema and was sent to ED for further eval. Pt states the headache has not changed in character. She denies loss of vision or eye pain. Denies n/v/d, abd pain, cp, fever, neck pain. Former tobacco smoker. Occasional marijuana use. No PMD. No neurologist.   ED course: CT head negative for acute pathology.  Plan for MRI/MRV in CDU. Neuro consult pending.  MRI/MRV/MRA were negative.  LP revealed an opening pressure of 24. Pt seen by neurology who recommended we start pt on diamox. pt was discharged home with outpt followup.

## 2021-05-21 NOTE — PROCEDURE NOTE - ADDITIONAL PROCEDURE DETAILS
S/P FLUOROSCOPICALLY GUIDED LUMBAR PUNCTURE AT THE L2-L3 LEVEL. OPENING CSF PRESSURE WAS 24 CM H2O IN THE LEFT LATERAL DECUBITUS POSITION. APPROXIMATELY 30 ML OF CLEAR CSF WERE DRAINED. CLOSING PRESSURE WAS NOT MEASURED. PT TOLERATED THE PROCEDURE WELL. HEMOSTASIS SECURE.  BAND-AID APPLIED TO LOWER BACK IS C/D/I.

## 2021-05-21 NOTE — CONSULT NOTE ADULT - ASSESSMENT
21F w/ no PMH presents w/ headaches, transient visual obscurations and OU papilledema consistent w/ increase ICP proccess, likely intracranial hypertension (i.e pseudotumor cerebri).     Plan:   [] IR guided LP   [] Diamox dosing dependent on OP   [] f/u with Dr. Dharmesh Campos at [phone] within 1 month of discharge   [] f/u with General Neurology [phone] within 2 weeks of discharge                  21F w/ no PMH presents w/ headaches, transient visual obscurations and OU papilledema consistent w/ increase ICP proccess, likely intracranial hypertension (i.e pseudotumor cerebri).     Plan:   [] IR guided LP borderline elevated OP 24  [] Diamox 500mg BID   [] f/u with Dr. Dharmesh Campos at [phone] within 1 month of discharge   [] f/u with General Neurology [phone] within 2 weeks of discharge

## 2021-05-21 NOTE — ED ADULT NURSE REASSESSMENT NOTE - NS ED NURSE REASSESS COMMENT FT1
Received pt from SAMMY Reid , received pt alert and responsive, oriented x4, denies any respiratory distress, SOB, or difficulty breathing. Pt transferred to CDU for MRI due to c/o photophobia, headaches and "dark vision"  IV in place, patent and free of signs of infiltration, placed on continuos cardiac monitoring as ordered, NSR HR in the,  pt denies chest pain or palpitations, V/S stable, pt afebrile, pt denies pain at this time. Pt educated on unit and unit rules, instructed patient to notify RN of any needed assistance, Pt verbalizes understanding, Call bell placed within reach. Safety maintained. Will continue to monitor. Received pt from SAMMY Reid , received pt alert and responsive, oriented x4, denies any respiratory distress, SOB, or difficulty breathing. Pt transferred to CDU for MRI due to c/o photophobia, headaches and "dark vision". Pending MRI in AM pt aware. On telemetry pt is SR hr: 80's.  IV in place, patent and free of signs of infiltration, pt denies chest pain or palpitations, V/S stable, pt afebrile, pt denies pain at this time. Pt educated on unit and unit rules, instructed patient to notify RN of any needed assistance, Pt verbalizes understanding, Call bell placed within reach. Safety maintained. Will continue to monitor. Received pt from SAMMY Reid , received pt alert and responsive, oriented x4, denies any respiratory distress, SOB, or difficulty breathing. Pt transferred to CDU for MRI due to c/o photophobia, headaches and "dark vision". HA currently 5/10 at this time. Pending MRI in AM pt aware. On telemetry pt is SR hr: 80's.  IV in place, patent and free of signs of infiltration, pt denies chest pain or palpitations, V/S stable, pt afebrile, pt denies pain at this time. Pt educated on unit and unit rules, instructed patient to notify RN of any needed assistance, Pt verbalizes understanding, Call bell placed within reach. Safety maintained. Will continue to monitor.

## 2021-05-24 LAB
CULTURE RESULTS: SIGNIFICANT CHANGE UP
SPECIMEN SOURCE: SIGNIFICANT CHANGE UP

## 2021-06-01 ENCOUNTER — APPOINTMENT (OUTPATIENT)
Dept: NEUROSURGERY | Facility: CLINIC | Age: 21
End: 2021-06-01
Payer: COMMERCIAL

## 2021-06-01 VITALS
OXYGEN SATURATION: 97 % | SYSTOLIC BLOOD PRESSURE: 125 MMHG | WEIGHT: 215 LBS | TEMPERATURE: 98.2 F | HEART RATE: 93 BPM | BODY MASS INDEX: 32.58 KG/M2 | DIASTOLIC BLOOD PRESSURE: 80 MMHG | HEIGHT: 68 IN

## 2021-06-01 DIAGNOSIS — Z02.82 ENCOUNTER FOR ADOPTION SERVICES: ICD-10-CM

## 2021-06-01 DIAGNOSIS — F32.9 MAJOR DEPRESSIVE DISORDER, SINGLE EPISODE, UNSPECIFIED: ICD-10-CM

## 2021-06-01 DIAGNOSIS — Z78.9 OTHER SPECIFIED HEALTH STATUS: ICD-10-CM

## 2021-06-01 DIAGNOSIS — F41.9 ANXIETY DISORDER, UNSPECIFIED: ICD-10-CM

## 2021-06-01 PROCEDURE — 99204 OFFICE O/P NEW MOD 45 MIN: CPT

## 2021-06-01 PROCEDURE — 99072 ADDL SUPL MATRL&STAF TM PHE: CPT

## 2021-06-10 ENCOUNTER — APPOINTMENT (OUTPATIENT)
Dept: OPHTHALMOLOGY | Facility: CLINIC | Age: 21
End: 2021-06-10
Payer: SUBSIDIZED

## 2021-06-10 ENCOUNTER — NON-APPOINTMENT (OUTPATIENT)
Age: 21
End: 2021-06-10

## 2021-06-10 PROCEDURE — 99215 OFFICE O/P EST HI 40 MIN: CPT

## 2021-06-10 PROCEDURE — 99072 ADDL SUPL MATRL&STAF TM PHE: CPT

## 2021-06-10 PROCEDURE — 92083 EXTENDED VISUAL FIELD XM: CPT

## 2021-06-10 PROCEDURE — 92133 CPTRZD OPH DX IMG PST SGM ON: CPT

## 2021-06-17 NOTE — HISTORY OF PRESENT ILLNESS
[de-identified] : Ms. ARBOLEDA is a pleasant 21 year old female who presents today with a chief complaint of idiopathic intracranial hypertension.  for the past year or two she has been having increased headaches with photosensitivity.  About 3 weeks ago she was having a bad headache and when she woke up her vision was very blurry.  She went to the eye MD who referred her to Dr. Bernard.  She was found to have papilledema and sent to the ED for work up.  She had an LP with an opening pressure of 24cm H2O.  She felt much better after the spinal tap for a couple of days.  Headaches and vision improved.  She was started on Diamox 500mg BID.  She is very tired, nauseas, tingling in hands and feet.\par \par Currently:\par Meds - Diamox 500mg BID, unpleasant side effects\par Pulsatile Tinnitus - None\par Headaches - Daily, Wakes up with headache every morning\par Vision - No blurry vision, diplopia or TVOs; Photosensitive\par

## 2021-06-18 ENCOUNTER — OUTPATIENT (OUTPATIENT)
Dept: OUTPATIENT SERVICES | Facility: HOSPITAL | Age: 21
LOS: 1 days | End: 2021-06-18
Payer: COMMERCIAL

## 2021-06-18 VITALS
HEIGHT: 68 IN | WEIGHT: 229.94 LBS | SYSTOLIC BLOOD PRESSURE: 126 MMHG | TEMPERATURE: 98 F | DIASTOLIC BLOOD PRESSURE: 70 MMHG | RESPIRATION RATE: 16 BRPM | HEART RATE: 78 BPM | OXYGEN SATURATION: 98 %

## 2021-06-18 DIAGNOSIS — G93.2 BENIGN INTRACRANIAL HYPERTENSION: ICD-10-CM

## 2021-06-18 DIAGNOSIS — Z90.89 ACQUIRED ABSENCE OF OTHER ORGANS: Chronic | ICD-10-CM

## 2021-06-18 DIAGNOSIS — Z01.818 ENCOUNTER FOR OTHER PREPROCEDURAL EXAMINATION: ICD-10-CM

## 2021-06-18 DIAGNOSIS — Z98.890 OTHER SPECIFIED POSTPROCEDURAL STATES: Chronic | ICD-10-CM

## 2021-06-18 LAB
ANION GAP SERPL CALC-SCNC: 14 MMOL/L — SIGNIFICANT CHANGE UP (ref 5–17)
BLD GP AB SCN SERPL QL: NEGATIVE — SIGNIFICANT CHANGE UP
BUN SERPL-MCNC: 11 MG/DL — SIGNIFICANT CHANGE UP (ref 7–23)
CALCIUM SERPL-MCNC: 9.4 MG/DL — SIGNIFICANT CHANGE UP (ref 8.4–10.5)
CHLORIDE SERPL-SCNC: 105 MMOL/L — SIGNIFICANT CHANGE UP (ref 96–108)
CO2 SERPL-SCNC: 19 MMOL/L — LOW (ref 22–31)
CREAT SERPL-MCNC: 0.6 MG/DL — SIGNIFICANT CHANGE UP (ref 0.5–1.3)
GLUCOSE SERPL-MCNC: 99 MG/DL — SIGNIFICANT CHANGE UP (ref 70–99)
HCT VFR BLD CALC: 43.9 % — SIGNIFICANT CHANGE UP (ref 34.5–45)
HGB BLD-MCNC: 14.7 G/DL — SIGNIFICANT CHANGE UP (ref 11.5–15.5)
INR BLD: 1.06 RATIO — SIGNIFICANT CHANGE UP (ref 0.88–1.16)
MCHC RBC-ENTMCNC: 28.9 PG — SIGNIFICANT CHANGE UP (ref 27–34)
MCHC RBC-ENTMCNC: 33.5 GM/DL — SIGNIFICANT CHANGE UP (ref 32–36)
MCV RBC AUTO: 86.4 FL — SIGNIFICANT CHANGE UP (ref 80–100)
NRBC # BLD: 0 /100 WBCS — SIGNIFICANT CHANGE UP (ref 0–0)
PLATELET # BLD AUTO: 353 K/UL — SIGNIFICANT CHANGE UP (ref 150–400)
POTASSIUM SERPL-MCNC: 4.1 MMOL/L — SIGNIFICANT CHANGE UP (ref 3.5–5.3)
POTASSIUM SERPL-SCNC: 4.1 MMOL/L — SIGNIFICANT CHANGE UP (ref 3.5–5.3)
PROTHROM AB SERPL-ACNC: 12.7 SEC — SIGNIFICANT CHANGE UP (ref 10.6–13.6)
RBC # BLD: 5.08 M/UL — SIGNIFICANT CHANGE UP (ref 3.8–5.2)
RBC # FLD: 13.3 % — SIGNIFICANT CHANGE UP (ref 10.3–14.5)
RH IG SCN BLD-IMP: POSITIVE — SIGNIFICANT CHANGE UP
SODIUM SERPL-SCNC: 138 MMOL/L — SIGNIFICANT CHANGE UP (ref 135–145)
WBC # BLD: 9.46 K/UL — SIGNIFICANT CHANGE UP (ref 3.8–10.5)
WBC # FLD AUTO: 9.46 K/UL — SIGNIFICANT CHANGE UP (ref 3.8–10.5)

## 2021-06-18 PROCEDURE — 85027 COMPLETE CBC AUTOMATED: CPT

## 2021-06-18 PROCEDURE — G0463: CPT

## 2021-06-18 PROCEDURE — 86901 BLOOD TYPING SEROLOGIC RH(D): CPT

## 2021-06-18 PROCEDURE — 85610 PROTHROMBIN TIME: CPT

## 2021-06-18 PROCEDURE — 86850 RBC ANTIBODY SCREEN: CPT

## 2021-06-18 PROCEDURE — 86900 BLOOD TYPING SEROLOGIC ABO: CPT

## 2021-06-18 PROCEDURE — 80048 BASIC METABOLIC PNL TOTAL CA: CPT

## 2021-06-18 NOTE — H&P PST ADULT - HISTORY OF PRESENT ILLNESS
21 year old RHD female  PMH Head ache ( head ache with light sensitivity in the past), anxiety with depression, DNS & s/p septoplasty in 01/2021, now presents for c/o increasing head ache with photosensitivity,  blurry vision for couple of months, tingling in fingers/ toes & occasional nausea, s/p ophthalmology consult, found to have  papilledema & sent to ER, s/p LP ( 5/26/21) opening pressure was 24 cms of H2O &  felt much better for few days, s/p neurosurgery consult, presents with preop diagnosis of lateral venous sinus stenosis/ idiopathic ICP, scheduled for  venous sinus stenosis on 06/21/21.     ***Instructed to continue Asprin 325 mgs &  Plavix ( started 6/17/21)take with sips of water in AM the day of surgery as per surgeon's order.  ***S/p Pfizer vaccing second dose on 05/03/21.

## 2021-06-18 NOTE — H&P PST ADULT - NSICDXPROBLEM_GEN_ALL_CORE_FT
PROBLEM DIAGNOSES  Problem: IIH (idiopathic intracranial hypertension)  Assessment and Plan:  presents with preop diagnosis of lateral venous sinus stenosis/ idiopathic ICP, scheduled for  venous sinus stenosis on 06/21/21.   ***Instructed to continue Asprin 325 mgs &  Plavix ( started 6/17/21)take with sips of water in AM the day of surgery as per surgeon's order.  ***S/p Pfizer vaccing second dose on 05/03/21.  Preop ucg

## 2021-06-18 NOTE — H&P PST ADULT - NSICDXPASTMEDICALHX_GEN_ALL_CORE_FT
PAST MEDICAL HISTORY:  ADHD H/O    Anemia due to acute blood loss h/o post op bleeding after tonsillectomy, 2019    Attention deficit hyperactivity disorder (ADHD), unspecified ADHD type     History of mononucleosis 2018, was hospitalised [place]  Clarisse barr virus infection & later had tonsillectomy

## 2021-06-21 ENCOUNTER — APPOINTMENT (OUTPATIENT)
Dept: NEUROSURGERY | Facility: CLINIC | Age: 21
End: 2021-06-21
Payer: COMMERCIAL

## 2021-06-21 ENCOUNTER — OUTPATIENT (OUTPATIENT)
Dept: OUTPATIENT SERVICES | Facility: HOSPITAL | Age: 21
LOS: 1 days | End: 2021-06-21
Payer: COMMERCIAL

## 2021-06-21 ENCOUNTER — RESULT REVIEW (OUTPATIENT)
Age: 21
End: 2021-06-21

## 2021-06-21 VITALS
HEART RATE: 99 BPM | RESPIRATION RATE: 18 BRPM | OXYGEN SATURATION: 98 % | WEIGHT: 220.02 LBS | DIASTOLIC BLOOD PRESSURE: 91 MMHG | SYSTOLIC BLOOD PRESSURE: 123 MMHG | HEIGHT: 68 IN

## 2021-06-21 VITALS
SYSTOLIC BLOOD PRESSURE: 112 MMHG | DIASTOLIC BLOOD PRESSURE: 87 MMHG | HEART RATE: 105 BPM | OXYGEN SATURATION: 97 % | RESPIRATION RATE: 12 BRPM

## 2021-06-21 DIAGNOSIS — Z90.89 ACQUIRED ABSENCE OF OTHER ORGANS: Chronic | ICD-10-CM

## 2021-06-21 DIAGNOSIS — Z98.890 OTHER SPECIFIED POSTPROCEDURAL STATES: Chronic | ICD-10-CM

## 2021-06-21 DIAGNOSIS — G93.2 BENIGN INTRACRANIAL HYPERTENSION: ICD-10-CM

## 2021-06-21 PROCEDURE — 36012 PLACE CATHETER IN VEIN: CPT | Mod: LT

## 2021-06-21 PROCEDURE — 36226 PLACE CATH VERTEBRAL ART: CPT

## 2021-06-21 PROCEDURE — 37238 OPEN/PERQ PLACE STENT SAME: CPT | Mod: LT

## 2021-06-21 PROCEDURE — C1769: CPT

## 2021-06-21 PROCEDURE — 75870 VEIN X-RAY SKULL: CPT | Mod: 26,59

## 2021-06-21 PROCEDURE — C2628: CPT

## 2021-06-21 PROCEDURE — 36226 PLACE CATH VERTEBRAL ART: CPT | Mod: 50

## 2021-06-21 PROCEDURE — C1894: CPT

## 2021-06-21 PROCEDURE — 36223 PLACE CATH CAROTID/INOM ART: CPT | Mod: 50

## 2021-06-21 PROCEDURE — 36012 PLACE CATHETER IN VEIN: CPT

## 2021-06-21 PROCEDURE — C1887: CPT

## 2021-06-21 PROCEDURE — 37238 OPEN/PERQ PLACE STENT SAME: CPT

## 2021-06-21 PROCEDURE — C1760: CPT

## 2021-06-21 PROCEDURE — C1725: CPT

## 2021-06-21 PROCEDURE — 36223 PLACE CATH CAROTID/INOM ART: CPT

## 2021-06-21 PROCEDURE — 75860 VEIN X-RAY NECK: CPT | Mod: 26,59

## 2021-06-21 RX ORDER — OXYCODONE AND ACETAMINOPHEN 5; 325 MG/1; MG/1
1 TABLET ORAL
Qty: 16 | Refills: 0
Start: 2021-06-21

## 2021-06-21 RX ORDER — BUPROPION HYDROCHLORIDE 150 MG/1
1 TABLET, EXTENDED RELEASE ORAL
Qty: 0 | Refills: 0 | DISCHARGE

## 2021-06-21 RX ORDER — CLOPIDOGREL BISULFATE 75 MG/1
1 TABLET, FILM COATED ORAL
Qty: 0 | Refills: 0 | DISCHARGE

## 2021-06-21 RX ORDER — SODIUM CHLORIDE 9 MG/ML
1000 INJECTION INTRAMUSCULAR; INTRAVENOUS; SUBCUTANEOUS
Refills: 0 | Status: DISCONTINUED | OUTPATIENT
Start: 2021-06-21 | End: 2021-07-05

## 2021-06-21 RX ORDER — ASPIRIN/CALCIUM CARB/MAGNESIUM 324 MG
1 TABLET ORAL
Qty: 0 | Refills: 0 | DISCHARGE

## 2021-06-21 NOTE — CHART NOTE - NSCHARTNOTEFT_GEN_A_CORE
Interventional Neuro- Radiology   Procedure Note PA-DON    Procedure: Catheter Cerebral Venogram, angiogram, manometry and venous sinus stenting   Pre- Procedure Diagnosis:  Post- Procedure Diagnosis:    : Dr Dharmesh Campos  Fellow:     Dr Eric Power   Physician Assistant: Teresa Nj PA-C    Nurse:                    Nadine Schulz RN   Radiologic Tech:  Anesthesiologist:  Sheath:      I/Os: EBL less than 10cc  IV fluids: 400cc Urine due to void Contrast Omnipaque 240      cc             Vitals: BP         HR      Spo2     %          Preliminary Report:  Using a 5 Chinese short sheath to the right femoral vein under MAC sedation a selective cerebral angiography was performed and demonstrated                       Official note to follow.  Patient tolerated procedure well, hemodynamically stable, no change in neurological status compared to baseline. Results discussed with patient and patient's father. Right groin sheath was removed, a Vascade device and manual compression held to hemostasis for 10 minutes, no active bleeding, no hematoma, quick clot and safeguard balloon dressing applied at Interventional Neuro- Radiology   Procedure Note PA-C    Procedure: Catheter Cerebral Venogram, angiogram, manometry and venous sinus stenting x 1    Pre- Procedure Diagnosis: idiopathic intracranial hypertension   Post- Procedure Diagnosis: same     : Dr Dharmesh Campos  Fellow:     Dr Eric Power   Physician Assistant: Teresa Nj PA-C    Nurse:                    Nadine Schulz RN   Radiologic Tech:    Chris Odonnell LRT  Anesthesiologist:   Dr Darcy Kauffman                 CRNA   Sheath:                  5/4 right wrist   Sheath:                   5 Burkinan short  sheath exchanged for 6 Burkinan infinity sheath     I/Os: EBL less than 10cc  IV fluids: 600cc Urine due to void Contrast Omnipaque 240 112cc       ACT  132    Heparin  5,000 units          Vitals: BP  114/81       HR 90    Spo2 100%          Preliminary Report:  Using a 5 Burkinan short sheath to the right femoral vein under MAC sedation was placed and later exchanged for a 6 Burkinan infinity and a 5/4 short sheath to the right radial sheath a selective cerebral venogram, angiogram, manometry, with one venous sinus stent placement was performed. Official note to follow.  Patient tolerated procedure well, hemodynamically stable, no change in neurological status compared to baseline. Results discussed with patient and patient's father. Right groin sheath was removed, a Vascade device and manual compression held to hemostasis for 10 minutes, no active bleeding, no hematoma, quick clot and safeguard balloon dressing applied at 10:30am. A TR band was placed at 10:45am, with 16 cc s of air. Disposition IR recovery room. Interventional Neuro- Radiology   Procedure Note PA-C    Procedure: Catheter Cerebral Venogram, angiogram, manometry and venous sinus stenting x 1    Pre- Procedure Diagnosis: idiopathic intracranial hypertension   Post- Procedure Diagnosis: same     : Dr Dharmesh Campos  Fellow:     Dr Eric Power   Physician Assistant: Teresa Nj PA-C    Nurse:                    Nadine Schulz RN   Radiologic Tech:    Chris Odonnell LRT  Anesthesiologist:   Dr Darcy Kauffman                 CRNA   Sheath:                  5/4 right wrist   Sheath:                  5 Martiniquais short  sheath exchanged for 6 Martiniquais infinity sheath     I/Os: EBL less than 10cc  IV fluids: 600cc Urine due to void Contrast Omnipaque 240 112cc       ACT  132    Heparin  5,000 units          Vitals: BP  114/81       HR 90    Spo2 100%          Preliminary Report:  Using a 5 Martiniquais short sheath to the right femoral vein under MAC sedation was placed and later exchanged for a 6 Martiniquais infinity and a 5/4 short sheath to the right radial sheath a selective cerebral venogram, angiogram, manometry, with one venous sinus stent placement was performed. Official note to follow.  Patient tolerated procedure well, hemodynamically stable, no change in neurological status compared to baseline. Results discussed with patient and patient's father. Right groin sheath was removed, a Vascade device and manual compression held to hemostasis for 10 minutes, no active bleeding, no hematoma, quick clot and safeguard balloon dressing applied at 10:30am. A TR band was placed at 10:45am, with 16 cc s of air. Disposition IR recovery room. Interventional Neuro- Radiology   Procedure Note PA-C    Procedure: Catheter Cerebral Venogram, angiogram, manometry and left venous sinus stenting x 1    Pre- Procedure Diagnosis: idiopathic intracranial hypertension   Post- Procedure Diagnosis: same     : Dr Dharmesh Campos  Fellow:     Dr Eric Power   Physician Assistant: Teresa Nj PA-C    Nurse:                    Nadine Schulz RN   Radiologic Tech:    Chris Odonnell LRT  Anesthesiologist:   Dr Darcy Kauffman CRNA   Sheath:                  5/4 right wrist   Sheath:                  5 Colombian short  sheath exchanged for 6 Colombian infinity sheath     I/Os: EBL less than 10cc  IV fluids: 600cc Urine due to void Contrast Omnipaque 240 112cc       ACT  132    Heparin  5,000 units          Vitals: BP  114/81       HR 90    Spo2 100%          Preliminary Report:  Using a 5 Colombian short sheath to the right femoral vein under MAC sedation was placed and later exchanged for a 6 Colombian infinity and a 5/4 short sheath to the right radial sheath a selective cerebral venogram, angiogram, manometry, with left one venous sinus stent placement was performed. Official note to follow.  Patient tolerated procedure well, hemodynamically stable, no change in neurological status compared to baseline. Results discussed with patient and patient's father. Right groin sheath was removed, a Vascade device and manual compression held to hemostasis for 10 minutes, no active bleeding, no hematoma, quick clot and safeguard balloon dressing applied at 10:30am. A TR band was placed at 10:45am, with 16 cc s of air. Disposition IR recovery room.

## 2021-06-21 NOTE — ASU DISCHARGE PLAN (ADULT/PEDIATRIC) - ASU DC SPECIAL INSTRUCTIONSFT
please call DR Dharmesh Campos [phone] please call DR Dharmesh Campos [phone]      Please do not use right arm for 3 days, no heavy lifting for 5 days, no gym for 5 days    Please take dressing off right wrist on Tuesday 6- at 2pm, if incision site is not bleeding leave open.  If there is bleeding place a bandaid.   If you have any questions please call [phone] or [phone]

## 2021-06-21 NOTE — ASU DISCHARGE PLAN (ADULT/PEDIATRIC) - NURSING INSTRUCTIONS
Please feel free to contact us at [phone] if any problems arise. After 6PM, Monday through Friday, on weekends and on holidays, please call [phone] and ask for the radiology resident on call to be paged.

## 2021-06-21 NOTE — ASU DISCHARGE PLAN (ADULT/PEDIATRIC) - CARE PROVIDER_API CALL
Dharmesh Campos; MPH)  Radiology  [address] [place] [address]  Phone: [phone]  Fax: [phone]  Follow Up Time:

## 2021-06-21 NOTE — CHART NOTE - NSCHARTNOTEFT_GEN_A_CORE
Interventional Neuro Radiology  Pre-Procedure Note PA-C    This is a 21 year old right hand dominant female with idiopathic intracranial hypertension, with daily headaches, who presents to Neuro IR for a catheter cerebral venogram, angiogram, manometry, and venous sinus stenting, accompanied by her Father.    Upon exam patient is A + O x 3, speech is articulate, follows three step commands, PERRL, EOMI, tongue is midline,+ facial symmetry, moves all extremities and ambulates without assist.    Allergies: No Known Allergies  PMHX: anxiety, mononucleosis, Clarisse barr virus infection, Attention deficit hyperactivity disorder, Anemia   PSHX: tonsillectomy, 2019, surgery on nasal septum 2021  Social History: non-tobacco smoker   FAMILY HISTORY:  Current Medications: ASA 325mg, Plavix 75 mg   Covid: not detected     CBC:           14.7  9.46   43.9     353     BMP:   138   105  11   4.1     19   0.60  9    Blood Bank: AB positive available       Assessment/Plan:   This is a 21 year old right hand dominant female with idiopathic intracranial hypertension, who presents to Neuro IR for a catheter cerebral venogram, angiogram, manometry, and venous sinus stenting. Procedure, goals, risks, benefits and alternatives were discussed with patient and patient's father. All questions were answered. Risks include but are not limited to stroke, vessel injury, hemorrhage, and or right groin hematoma. Patient demonstrates understanding of all risks involved with this procedure and wishes to continue.  Appropriate content was obtained from patient and consent is in the patient's chart.

## 2021-06-22 PROBLEM — F90.9 ATTENTION-DEFICIT HYPERACTIVITY DISORDER, UNSPECIFIED TYPE: Chronic | Status: ACTIVE | Noted: 2021-06-18

## 2021-06-22 PROBLEM — D62 ACUTE POSTHEMORRHAGIC ANEMIA: Chronic | Status: ACTIVE | Noted: 2021-06-18

## 2021-06-22 PROBLEM — Z86.19 PERSONAL HISTORY OF OTHER INFECTIOUS AND PARASITIC DISEASES: Chronic | Status: ACTIVE | Noted: 2021-06-18

## 2021-06-24 DIAGNOSIS — I87.1 COMPRESSION OF VEIN: ICD-10-CM

## 2021-07-02 ENCOUNTER — APPOINTMENT (OUTPATIENT)
Dept: NEUROSURGERY | Facility: CLINIC | Age: 21
End: 2021-07-02
Payer: COMMERCIAL

## 2021-07-02 VITALS
HEIGHT: 68 IN | HEART RATE: 92 BPM | DIASTOLIC BLOOD PRESSURE: 80 MMHG | TEMPERATURE: 98.2 F | OXYGEN SATURATION: 98 % | SYSTOLIC BLOOD PRESSURE: 117 MMHG | WEIGHT: 215 LBS | BODY MASS INDEX: 32.58 KG/M2

## 2021-07-02 PROCEDURE — 99213 OFFICE O/P EST LOW 20 MIN: CPT

## 2021-07-02 PROCEDURE — 99072 ADDL SUPL MATRL&STAF TM PHE: CPT

## 2021-07-02 RX ORDER — BUPROPION HYDROCHLORIDE 100 MG/1
TABLET, FILM COATED ORAL
Refills: 0 | Status: DISCONTINUED | COMMUNITY
End: 2021-07-02

## 2021-07-02 NOTE — HISTORY OF PRESENT ILLNESS
[FreeTextEntry1] : Ms. ARBOLEDA is a pleasant 20yo female who presents today after successful venous sinus stenting on 6/21/21 by Dr. Campos.  The patient went home the same day in good condition.  She reports very mild localized headaches post procedure that resolved within a couple of days.  Since that time she does not have any headaches.  She thinks her vision is much more clear, pending follow up appointment with Dr. Bernard (neuro-ophthalmology).  She also states that she no longer has photosensitivity.   Her puncture sites are well healed, not painful.  Denies headaches, dizziness, visual scintillations, episodes of visual loss or blurring, diplopia, hearing changes, tinnitus, speech problems, swallowing difficulties, numbness, tingling, weakness, tremors, twitches, seizures, imbalance or coordination difficulties\par  \par She remains compliant with ASA 325mg and Plavix 75mg daily.\par \par She continues to take Diamox 500mg BID, which she was on prior to the procedure.  She is hoping that when she sees Dr. Bernard soon she can start to decrease her dose.

## 2021-07-02 NOTE — PHYSICAL EXAM
[General Appearance - Alert] : alert [General Appearance - In No Acute Distress] : in no acute distress [General Appearance - Well-Appearing] : healthy appearing [Oriented To Time, Place, And Person] : oriented to person, place, and time [Person] : oriented to person [Place] : oriented to place [Time] : oriented to time [Sclera] : the sclera and conjunctiva were normal [Outer Ear] : the ears and nose were normal in appearance [Neck Appearance] : the appearance of the neck was normal [] : no respiratory distress [Heart Rate And Rhythm] : heart rate was normal and rhythm regular [Abnormal Walk] : normal gait [Skin Color & Pigmentation] : normal skin color and pigmentation [FreeTextEntry1] : Right Groin Puncture site - KARISHMA, Clean/dry/intact, no tenderness, no ecchymosis, no drainage; Right radial puncture site - KARISHMA, clean/dry/intact, no tenderness, no ecchymosis, no drainage, cap refill <3 sec, no forearm tenderness or numbness/tingling in right fingers

## 2021-07-02 NOTE — ASSESSMENT
[FreeTextEntry1] : Impression:\par IIH with Failure/Intolerance of Medical Managment\par s/p Successful Venous Sinus Stenting on 6/21/21\par Headaches completely resolved\par Very Happy with how well she feels\par \par Plan:\par Continue ASA 325mg and Plavix 75mg Daily\par MRV Head w/wo contrast in 9/2021\par Follow Up Visit with Dr. Campos after imaging\par

## 2021-08-02 NOTE — PRE-ANESTHESIA EVALUATION ADULT - NSANTHSUBSTSD_GEN_ALL_CORE
[facility]      OUTPATIENT PHYSICAL THERAPY EVALUATION  PLAN OF TREATMENT FOR OUTPATIENT REHABILITATION  (COMPLETE FOR INITIAL CLAIMS ONLY)  Patient's Last Name, First Name, M.I.  YOB: 1958  Lara Streeter                        Provider's Name  [facility] Medical Record No.  [id number]                               Onset Date:  07/26/21   Start of Care Date:  (P) 07/27/21      Type:     _X_PT   ___OT   ___SLP Medical Diagnosis:  (P) R TKA                        PT Diagnosis:  impaired mobility   Visits from SOC:  1   _________________________________________________________________________________  Plan of Treatment/Functional Goals    Planned Interventions: gait training, home exercise program, ROM (range of motion), stair training, strengthening     Goals: See Physical Therapy Goals on Care Plan in [facility] electronic health record.    Therapy Frequency: One time eval and treatment only  Predicted Duration of Therapy Intervention: 1 day  _________________________________________________________________________________    I CERTIFY THE NEED FOR THESE SERVICES FURNISHED UNDER        THIS PLAN OF TREATMENT AND WHILE UNDER MY CARE     (Physician co-signature of this document indicates review and certification of the therapy plan).                Certification date from: (P) 07/27/21, Certification date to: (P) 08/27/21    Referring Physician: Vamshi Romero            Initial Assessment        See Physical Therapy evaluation dated (P) 07/27/21 in Epic electronic health record.   No

## 2021-08-05 ENCOUNTER — NON-APPOINTMENT (OUTPATIENT)
Age: 21
End: 2021-08-05

## 2021-08-06 ENCOUNTER — NON-APPOINTMENT (OUTPATIENT)
Age: 21
End: 2021-08-06

## 2021-08-06 ENCOUNTER — APPOINTMENT (OUTPATIENT)
Dept: OPHTHALMOLOGY | Facility: CLINIC | Age: 21
End: 2021-08-06
Payer: COMMERCIAL

## 2021-08-06 PROCEDURE — 99215 OFFICE O/P EST HI 40 MIN: CPT

## 2021-08-06 PROCEDURE — 92133 CPTRZD OPH DX IMG PST SGM ON: CPT

## 2021-08-06 PROCEDURE — 92083 EXTENDED VISUAL FIELD XM: CPT

## 2021-08-23 ENCOUNTER — NON-APPOINTMENT (OUTPATIENT)
Age: 21
End: 2021-08-23

## 2021-08-23 ENCOUNTER — APPOINTMENT (OUTPATIENT)
Dept: OPHTHALMOLOGY | Facility: CLINIC | Age: 21
End: 2021-08-23
Payer: COMMERCIAL

## 2021-08-23 PROCEDURE — 92083 EXTENDED VISUAL FIELD XM: CPT

## 2021-08-23 PROCEDURE — 92133 CPTRZD OPH DX IMG PST SGM ON: CPT

## 2021-08-23 PROCEDURE — 99215 OFFICE O/P EST HI 40 MIN: CPT

## 2021-09-22 ENCOUNTER — APPOINTMENT (OUTPATIENT)
Dept: OPHTHALMOLOGY | Facility: CLINIC | Age: 21
End: 2021-09-22
Payer: SUBSIDIZED

## 2021-09-22 ENCOUNTER — NON-APPOINTMENT (OUTPATIENT)
Age: 21
End: 2021-09-22

## 2021-09-22 PROCEDURE — 92014 COMPRE OPH EXAM EST PT 1/>: CPT

## 2021-09-22 PROCEDURE — 99072 ADDL SUPL MATRL&STAF TM PHE: CPT

## 2021-09-22 PROCEDURE — 92015 DETERMINE REFRACTIVE STATE: CPT

## 2021-09-22 PROCEDURE — 92083 EXTENDED VISUAL FIELD XM: CPT

## 2021-09-22 PROCEDURE — 92250 FUNDUS PHOTOGRAPHY W/I&R: CPT

## 2021-09-23 ENCOUNTER — APPOINTMENT (OUTPATIENT)
Dept: NEUROSURGERY | Facility: CLINIC | Age: 21
End: 2021-09-23
Payer: COMMERCIAL

## 2021-09-23 PROCEDURE — 99213 OFFICE O/P EST LOW 20 MIN: CPT

## 2021-09-23 NOTE — PHYSICAL EXAM
[General Appearance - In No Acute Distress] : in no acute distress [General Appearance - Alert] : alert [General Appearance - Well-Appearing] : healthy appearing [Oriented To Time, Place, And Person] : oriented to person, place, and time [Person] : oriented to person [Place] : oriented to place [Time] : oriented to time [Sclera] : the sclera and conjunctiva were normal [Outer Ear] : the ears and nose were normal in appearance [Neck Appearance] : the appearance of the neck was normal [] : no respiratory distress [Heart Rate And Rhythm] : heart rate was normal and rhythm regular [Abnormal Walk] : normal gait [Skin Color & Pigmentation] : normal skin color and pigmentation

## 2021-09-28 ENCOUNTER — NON-APPOINTMENT (OUTPATIENT)
Age: 21
End: 2021-09-28

## 2021-10-10 NOTE — ASSESSMENT
[FreeTextEntry1] : Impression:\par IIH with Failure/Intolerance of Medical Managment\par s/p Successful Venous Sinus Stenting on 6/21/21\par Papilledema Completely Resolved\par Continues to have headaches, different from high pressure headaches\par \par Plan:\par Continue ASA 325mg and Plavix 75mg Daily until MRV is review\par MRV Head w/wo contrast\par Follow Up Visit with Dr. Campos after imaging\par Establish Care with Neurologist for Headache Management

## 2021-10-10 NOTE — HISTORY OF PRESENT ILLNESS
[FreeTextEntry1] : Ms. ARBOLEDA is a pleasant 22yo female who presents today after successful venous sinus stenting on 6/21/21 for IIH refractory to medical management.  She has some headaches recently but they are not the same that she was having prior to the procedure.  She saw neuro-ophthalmology yesterday and her papilledema has completely resolved.  She does not wake up with headaches in the morning.  She thinks her vision is much more clear.  Denies dizziness, visual scintillations, episodes of visual loss or blurring, diplopia, hearing changes, tinnitus, speech problems, swallowing difficulties, numbness, tingling, weakness, tremors, twitches, seizures, imbalance or coordination difficulties.\par \par She continues to take Diamox 500mg BID, down from 1000mg at our last visit.\par  \par She remains compliant with ASA 325mg and Plavix 75mg daily.\par \par

## 2021-10-12 ENCOUNTER — APPOINTMENT (OUTPATIENT)
Dept: MRI IMAGING | Facility: CLINIC | Age: 21
End: 2021-10-12
Payer: COMMERCIAL

## 2021-10-12 ENCOUNTER — OUTPATIENT (OUTPATIENT)
Dept: OUTPATIENT SERVICES | Facility: HOSPITAL | Age: 21
LOS: 1 days | End: 2021-10-12

## 2021-10-12 ENCOUNTER — RESULT REVIEW (OUTPATIENT)
Age: 21
End: 2021-10-12

## 2021-10-12 DIAGNOSIS — Z90.89 ACQUIRED ABSENCE OF OTHER ORGANS: Chronic | ICD-10-CM

## 2021-10-12 DIAGNOSIS — Z98.890 OTHER SPECIFIED POSTPROCEDURAL STATES: Chronic | ICD-10-CM

## 2021-10-12 PROCEDURE — 70546 MR ANGIOGRAPH HEAD W/O&W/DYE: CPT | Mod: 26

## 2021-12-08 ENCOUNTER — APPOINTMENT (OUTPATIENT)
Dept: OPHTHALMOLOGY | Facility: CLINIC | Age: 21
End: 2021-12-08

## 2022-01-18 ENCOUNTER — APPOINTMENT (OUTPATIENT)
Dept: NEUROSURGERY | Facility: CLINIC | Age: 22
End: 2022-01-18
Payer: COMMERCIAL

## 2022-01-18 DIAGNOSIS — G93.2 BENIGN INTRACRANIAL HYPERTENSION: ICD-10-CM

## 2022-01-18 PROCEDURE — 99213 OFFICE O/P EST LOW 20 MIN: CPT

## 2022-01-18 RX ORDER — PANTOPRAZOLE 40 MG/1
40 TABLET, DELAYED RELEASE ORAL DAILY
Qty: 30 | Refills: 6 | Status: DISCONTINUED | COMMUNITY
Start: 2021-08-20 | End: 2022-01-18

## 2022-01-18 RX ORDER — ACETAZOLAMIDE 500 MG/1
500 CAPSULE ORAL
Qty: 60 | Refills: 3 | Status: DISCONTINUED | COMMUNITY
Start: 2021-09-10 | End: 2022-01-18

## 2022-01-18 RX ORDER — CLOPIDOGREL BISULFATE 75 MG/1
75 TABLET, FILM COATED ORAL
Qty: 30 | Refills: 2 | Status: DISCONTINUED | COMMUNITY
Start: 2021-06-16 | End: 2022-01-18

## 2022-01-18 RX ORDER — ACETAZOLAMIDE 500 MG/1
500 CAPSULE ORAL
Qty: 60 | Refills: 3 | Status: DISCONTINUED | COMMUNITY
Start: 2021-10-15 | End: 2022-01-18

## 2022-01-18 NOTE — HISTORY OF PRESENT ILLNESS
[FreeTextEntry1] : Ms. ARBOLEDA is a pleasant 20yo female who presents today after successful venous sinus stenting on 6/21/21 for IIH refractory to medical management.  She has some headaches recently but they are not the same that she was having prior to the procedure.  She does not wake up with headaches in the morning.  \par \par She thinks her vision is much more clear but they are going in and out of focus very easily.  She \par \par Denies dizziness, visual scintillations, episodes of visual loss or blurring, diplopia, hearing changes, tinnitus, speech problems, swallowing difficulties, numbness, tingling, weakness, tremors, twitches, seizures, imbalance or coordination difficulties.\par \par She continues to take Diamox 500mg daily, down from 1000mg at the time of procedure.\par  \par She remains compliant with ASA 325mg daily.\par \par

## 2022-01-18 NOTE — ASSESSMENT
[FreeTextEntry1] : Impression:\par IIH with Failure/Intolerance of Medical Managment\par s/p Successful Venous Sinus Stenting on 6/21/21\par Papilledema Completely Resolved\par Headaches much improved\par \par \par Plan:\par Continue ASA 325mg Daily\par MRV Head w/wo contrast in 6/2022\par Follow Up Visit with Dr. Campos after imaging

## 2022-01-26 ENCOUNTER — APPOINTMENT (OUTPATIENT)
Dept: OPHTHALMOLOGY | Facility: CLINIC | Age: 22
End: 2022-01-26
Payer: SUBSIDIZED

## 2022-01-26 ENCOUNTER — NON-APPOINTMENT (OUTPATIENT)
Age: 22
End: 2022-01-26

## 2022-01-26 PROCEDURE — 99214 OFFICE O/P EST MOD 30 MIN: CPT

## 2022-01-26 PROCEDURE — 92015 DETERMINE REFRACTIVE STATE: CPT | Mod: NC

## 2022-01-26 PROCEDURE — 99072 ADDL SUPL MATRL&STAF TM PHE: CPT

## 2022-01-26 PROCEDURE — 92083 EXTENDED VISUAL FIELD XM: CPT

## 2022-04-04 ENCOUNTER — FORM ENCOUNTER (OUTPATIENT)
Age: 22
End: 2022-04-04

## 2022-04-13 ENCOUNTER — FORM ENCOUNTER (OUTPATIENT)
Age: 22
End: 2022-04-13

## 2022-07-13 ENCOUNTER — APPOINTMENT (OUTPATIENT)
Dept: OPHTHALMOLOGY | Facility: CLINIC | Age: 22
End: 2022-07-13

## 2022-09-12 ENCOUNTER — APPOINTMENT (OUTPATIENT)
Dept: NEUROSURGERY | Facility: CLINIC | Age: 22
End: 2022-09-12

## 2022-10-27 VITALS
DIASTOLIC BLOOD PRESSURE: 79 MMHG | TEMPERATURE: 97.2 F | WEIGHT: 252.87 LBS | BODY MASS INDEX: 39.69 KG/M2 | HEIGHT: 67.09 IN | SYSTOLIC BLOOD PRESSURE: 111 MMHG

## 2022-11-13 NOTE — H&P PST ADULT - LIVING CHILDREN, OB PROFILE
See comments below and call patient to discuss.   Please close encounter when done -- no need to route back to me.  Thanks  Amio blood level is too  low at 0.4/0.2  I reviewed his meds and he is apparently using 100 mg tabs instead of 200 mg tabs. He is taking 1.5 tab - so 150 mg  Will increase dose to 200 bid for 6 weeks then 300 mg a day  I will send a new prescription  
See comments below and call patient to discuss.   Please close encounter when done -- no need to route back to me.  Thanks  Mexitil blood level is 0.6 - OK but a bit on the lower side of Rxc   Current dose is 300 Q 12 hrs   If he gets more VT, will increase to 300 Q 8hrs - as long as there are no side effects 
0

## 2022-12-01 VITALS
SYSTOLIC BLOOD PRESSURE: 110 MMHG | DIASTOLIC BLOOD PRESSURE: 73 MMHG | WEIGHT: 245.4 LBS | HEART RATE: 84 BPM | TEMPERATURE: 98.1 F

## 2022-12-29 ENCOUNTER — FORM ENCOUNTER (OUTPATIENT)
Age: 22
End: 2022-12-29

## 2023-01-25 ENCOUNTER — APPOINTMENT (OUTPATIENT)
Dept: BARIATRICS | Facility: CLINIC | Age: 23
End: 2023-01-25

## 2023-02-06 ENCOUNTER — FORM ENCOUNTER (OUTPATIENT)
Age: 23
End: 2023-02-06

## 2023-02-22 ENCOUNTER — RX RENEWAL (OUTPATIENT)
Age: 23
End: 2023-02-22

## 2023-02-22 ENCOUNTER — APPOINTMENT (OUTPATIENT)
Dept: BARIATRICS | Facility: CLINIC | Age: 23
End: 2023-02-22

## 2023-02-23 ENCOUNTER — RX RENEWAL (OUTPATIENT)
Age: 23
End: 2023-02-23

## 2023-02-24 ENCOUNTER — RX RENEWAL (OUTPATIENT)
Age: 23
End: 2023-02-24

## 2023-03-06 ENCOUNTER — RX RENEWAL (OUTPATIENT)
Age: 23
End: 2023-03-06

## 2023-03-15 ENCOUNTER — NON-APPOINTMENT (OUTPATIENT)
Age: 23
End: 2023-03-15

## 2023-03-15 DIAGNOSIS — J30.1 ALLERGIC RHINITIS DUE TO POLLEN: ICD-10-CM

## 2023-03-15 DIAGNOSIS — G47.9 SLEEP DISORDER, UNSPECIFIED: ICD-10-CM

## 2023-03-16 ENCOUNTER — APPOINTMENT (OUTPATIENT)
Dept: PEDIATRICS | Facility: CLINIC | Age: 23
End: 2023-03-16

## 2023-03-16 VITALS
SYSTOLIC BLOOD PRESSURE: 115 MMHG | DIASTOLIC BLOOD PRESSURE: 81 MMHG | HEART RATE: 105 BPM | BODY MASS INDEX: 36.18 KG/M2 | HEIGHT: 67.24 IN | TEMPERATURE: 96.8 F | WEIGHT: 233.25 LBS

## 2023-03-16 RX ORDER — ACETAZOLAMIDE 250 MG
TABLET ORAL
Refills: 0 | Status: DISCONTINUED | COMMUNITY
End: 2023-03-16

## 2023-03-16 RX ORDER — ALBUTEROL SULFATE 90 UG/1
108 (90 BASE) INHALANT RESPIRATORY (INHALATION)
Refills: 0 | Status: DISCONTINUED | COMMUNITY
End: 2023-03-16

## 2023-03-16 RX ORDER — ONDANSETRON 8 MG/1
8 TABLET ORAL
Refills: 0 | Status: DISCONTINUED | COMMUNITY
End: 2023-03-16

## 2023-03-16 RX ORDER — ASPIRIN 325 MG/1
325 TABLET, FILM COATED ORAL DAILY
Qty: 30 | Refills: 6 | Status: DISCONTINUED | COMMUNITY
Start: 2021-06-16 | End: 2023-03-16

## 2023-03-16 NOTE — ASSESSMENT
[FreeTextEntry1] : She is doing extremely well, lost about 19 pounds since Oct 2022.  We will increase dosage of Ozempic to 2 mg per dose. \par \par

## 2023-03-16 NOTE — HISTORY OF PRESENT ILLNESS
[FreeTextEntry1] : Doing very well.  She lost a lot of weight. She is on Ozempic and doing very well. She does cardio exercise at home.  She has to carry a 40 pound suitcase of make up [place] around the subway and up and down stair.

## 2023-06-19 ENCOUNTER — RX RENEWAL (OUTPATIENT)
Age: 23
End: 2023-06-19

## 2023-06-20 ENCOUNTER — RX RENEWAL (OUTPATIENT)
Age: 23
End: 2023-06-20

## 2023-08-01 NOTE — H&P PST ADULT - NEGATIVE PSYCHIATRIC SYMPTOMS
Refer to the Assessment tab to view/cancel completed assessment.
no suicidal ideation/no depression/no visual hallucinations/no auditory hallucinations/no hyperactivity

## 2023-09-11 ENCOUNTER — APPOINTMENT (OUTPATIENT)
Dept: PEDIATRICS | Facility: CLINIC | Age: 23
End: 2023-09-11

## 2023-10-12 ENCOUNTER — APPOINTMENT (OUTPATIENT)
Dept: PEDIATRICS | Facility: CLINIC | Age: 23
End: 2023-10-12

## 2023-10-12 VITALS
WEIGHT: 235.01 LBS | HEIGHT: 66.93 IN | SYSTOLIC BLOOD PRESSURE: 119 MMHG | TEMPERATURE: 96.5 F | BODY MASS INDEX: 36.89 KG/M2 | HEART RATE: 91 BPM | DIASTOLIC BLOOD PRESSURE: 82 MMHG

## 2023-10-12 DIAGNOSIS — Z86.39 PERSONAL HISTORY OF OTHER ENDOCRINE, NUTRITIONAL AND METABOLIC DISEASE: ICD-10-CM

## 2023-10-12 DIAGNOSIS — E66.01 MORBID (SEVERE) OBESITY DUE TO EXCESS CALORIES: ICD-10-CM

## 2023-10-12 DIAGNOSIS — E66.1 DRUG-INDUCED OBESITY: ICD-10-CM

## 2023-10-12 RX ORDER — BUPROPION HYDROCHLORIDE 300 MG/1
300 TABLET, EXTENDED RELEASE ORAL
Refills: 0 | Status: DISCONTINUED | COMMUNITY
End: 2023-10-12

## 2023-10-12 RX ORDER — SEMAGLUTIDE 2.68 MG/ML
8 INJECTION, SOLUTION SUBCUTANEOUS
Qty: 1 | Refills: 3 | Status: DISCONTINUED | COMMUNITY
Start: 2023-03-16 | End: 2023-10-12

## 2023-10-18 DIAGNOSIS — E66.9 OBESITY, UNSPECIFIED: ICD-10-CM

## 2023-10-18 RX ORDER — SEMAGLUTIDE 1.34 MG/ML
4 INJECTION, SOLUTION SUBCUTANEOUS
Qty: 4 | Refills: 2 | Status: ACTIVE | COMMUNITY
Start: 2023-03-06 | End: 1900-01-01

## 2024-01-19 ENCOUNTER — APPOINTMENT (OUTPATIENT)
Dept: PEDIATRICS | Facility: CLINIC | Age: 24
End: 2024-01-19

## 2024-04-23 ENCOUNTER — APPOINTMENT (OUTPATIENT)
Dept: PEDIATRICS | Facility: CLINIC | Age: 24
End: 2024-04-23

## 2024-05-08 ENCOUNTER — EMERGENCY (EMERGENCY)
Facility: HOSPITAL | Age: 24
LOS: 1 days | Discharge: HOME | End: 2024-05-08
Attending: EMERGENCY MEDICINE | Admitting: EMERGENCY MEDICINE
Payer: COMMERCIAL

## 2024-05-08 VITALS
DIASTOLIC BLOOD PRESSURE: 73 MMHG | HEIGHT: 67 IN | HEART RATE: 98 BPM | SYSTOLIC BLOOD PRESSURE: 123 MMHG | RESPIRATION RATE: 16 BRPM | OXYGEN SATURATION: 98 % | WEIGHT: 220.02 LBS | TEMPERATURE: 98 F

## 2024-05-08 DIAGNOSIS — Z98.890 OTHER SPECIFIED POSTPROCEDURAL STATES: Chronic | ICD-10-CM

## 2024-05-08 DIAGNOSIS — Z90.89 ACQUIRED ABSENCE OF OTHER ORGANS: Chronic | ICD-10-CM

## 2024-05-08 PROCEDURE — 99284 EMERGENCY DEPT VISIT MOD MDM: CPT

## 2024-05-08 RX ORDER — CYCLOBENZAPRINE HYDROCHLORIDE 10 MG/1
10 TABLET, FILM COATED ORAL ONCE
Refills: 0 | Status: COMPLETED | OUTPATIENT
Start: 2024-05-08 | End: 2024-05-08

## 2024-05-08 RX ORDER — MELOXICAM 15 MG/1
1 TABLET ORAL
Qty: 20 | Refills: 0
Start: 2024-05-08 | End: 2024-05-17

## 2024-05-08 RX ORDER — KETOROLAC TROMETHAMINE 30 MG/ML
30 SYRINGE (ML) INJECTION ONCE
Refills: 0 | Status: DISCONTINUED | OUTPATIENT
Start: 2024-05-08 | End: 2024-05-08

## 2024-05-08 RX ADMIN — Medication 30 MILLIGRAM(S): at 05:19

## 2024-05-08 RX ADMIN — CYCLOBENZAPRINE HYDROCHLORIDE 10 MILLIGRAM(S): 10 TABLET, FILM COATED ORAL at 05:19

## 2024-05-08 NOTE — ED PROVIDER NOTE - CARE PROVIDER_API CALL
Julián Henson  Orthopaedic Sports Medicine  [address]  [place] [address]  Phone: [phone]  Fax: [phone]  Follow Up Time:

## 2024-05-08 NOTE — ED PROVIDER NOTE - OBJECTIVE STATEMENT
24-year-old female with PMHx of ADHD presenting to the emergency room complaining of left hip pain.  States that she has been experiencing the pain for the past 10 months, states that it acutely worsened over the past 2 days, possibly related to a strain that she had while walking in [place].  [facility] that [facility], was advised to follow-up with her primary care doctor for an orthopedic referral.  Was given 1000 mg of Tylenol, states that she took Tylenol overnight as well before coming to the ER, states that the pain is starting to subside but still has some pain.  States that she has had multiple x-rays and CTs of her hip without an official diagnosis.  Has not yet followed up with an orthopedic surgeon.

## 2024-05-08 NOTE — ED PROVIDER NOTE - PATIENT PORTAL LINK FT
You can access the FollowMyHealth Patient Portal offered by [facility] by registering at the following website: [email or web address]. By joining [employer]’s FollowMyHealth portal, you will also be able to view your health information using other applications (apps) compatible with our system.

## 2024-05-08 NOTE — ED PROVIDER NOTE - PROGRESS NOTE DETAILS
Patient reports improvement in symptoms.  Advised patient Follow-up with orthopedic surgery as an outpatient and to obtain an MRI.  Recommending Mobic as needed.  All questions answered, well-appearing upon discharge.

## 2024-05-08 NOTE — ED PROVIDER NOTE - PHYSICAL EXAMINATION
Gen: Well appearing  Extremity exam: Grossly normal in appearance of LLE.  Difficulty with flexion at hip, able to extend at hip easily.  Palpation of anterior proximal hip reproduces some pain near inguinal area.  Able to raise leg off bed unassisted.  Distal pulses intact

## 2024-05-08 NOTE — ED PROVIDER NOTE - CARE PROVIDERS DIRECT ADDRESSES
[email or web address]

## 2024-05-10 ENCOUNTER — APPOINTMENT (OUTPATIENT)
Age: 24
End: 2024-05-10
Payer: COMMERCIAL

## 2024-05-10 VITALS
OXYGEN SATURATION: 96 % | SYSTOLIC BLOOD PRESSURE: 125 MMHG | HEIGHT: 67 IN | HEART RATE: 92 BPM | WEIGHT: 220 LBS | DIASTOLIC BLOOD PRESSURE: 85 MMHG | BODY MASS INDEX: 34.53 KG/M2

## 2024-05-10 DIAGNOSIS — S76.012A STRAIN OF MUSCLE, FASCIA AND TENDON OF LEFT HIP, INITIAL ENCOUNTER: ICD-10-CM

## 2024-05-10 DIAGNOSIS — F90.9 ATTENTION-DEFICIT HYPERACTIVITY DISORDER, UNSPECIFIED TYPE: ICD-10-CM
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
## 2024-05-10 DIAGNOSIS — M25.552 PAIN IN LEFT HIP: ICD-10-CM

## 2024-05-10 PROCEDURE — 99204 OFFICE O/P NEW MOD 45 MIN: CPT

## 2024-05-10 RX ORDER — DICLOFENAC SODIUM 75 MG/1
75 TABLET, DELAYED RELEASE ORAL
Qty: 30 | Refills: 1 | Status: ACTIVE | COMMUNITY
Start: 2024-05-10 | End: 1900-01-01

## 2024-05-10 NOTE — PHYSICAL EXAM
[de-identified] : General: Alert and oriented  Body habitus: Obese Appearance: Within normal limits, calm  Ambulation: Within normal limits  Behavior: Within normal limits  Psychiatric: Cooperative, appropriate mood & affect  Neurologic: Alert, Oriented  Neck: No jugular venous distention  Eye: Extraocular movements are intact  Respiratory: Respirations are non-labored  Cardiovascular: Normal peripheral perfusion, no edema  MSK: Exam limited due to pain.  Patient unable to straighten leg while lying on her back due to significant pain.  Limited logroll was significantly positive as well as WILLIAMS and FADIR.  Abductor squeeze negative.

## 2024-05-10 NOTE — DISCUSSION/SUMMARY
[de-identified] : ONIEL ARBOLEDA is a 24 year old female presenting with acute on chronic left-sided severe hip pain that radiates to the groin potentially consistent with stress injury versus KRISTI versus labral tear versus muscle strain.  Patient has had multiple x-rays over the last few months all of which she states are negative however does not have images at this time.  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  Overall recommend the followin.  Stat left hip MRI ordered 2.  Discussed switching from Mobic to diclofenac 75 mg up to twice a day as needed and continuing Tylenol up to 1 g 3-4 times a day for pain control 3.  Will call patient with results

## 2024-05-10 NOTE — HISTORY OF PRESENT ILLNESS
[de-identified] : ONIEL ARBOLEDA is a 24 year old female presenting with acute on chronic severe left-sided hip pain that radiates into the groin.  She states she has been dealing with this for close to a year and has seen multiple doctors but has never had an official diagnosis.  She does not know a specific cause of the pain when it started.  She said it was gradually getting worse and initially then got better over time and now periodically flares up.  She states that is never flared up this bad and over the last 10 days she has had severe pain with inability to bear weight without being in significant pain.  The pain radiates to the groin mostly.  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  She went to the emergency room a few days ago at [facility] and was told to follow-up with orthopedics.

## 2024-05-13 ENCOUNTER — NON-APPOINTMENT (OUTPATIENT)
Age: 24
End: 2024-05-13

## 2024-05-14 ENCOUNTER — APPOINTMENT (OUTPATIENT)
Dept: MRI IMAGING | Facility: CLINIC | Age: 24
End: 2024-05-14
Payer: COMMERCIAL

## 2024-05-14 ENCOUNTER — TRANSCRIPTION ENCOUNTER (OUTPATIENT)
Age: 24
End: 2024-05-14

## 2024-05-14 ENCOUNTER — OUTPATIENT (OUTPATIENT)
Dept: OUTPATIENT SERVICES | Facility: HOSPITAL | Age: 24
LOS: 1 days | End: 2024-05-14

## 2024-05-14 DIAGNOSIS — Z98.890 OTHER SPECIFIED POSTPROCEDURAL STATES: Chronic | ICD-10-CM

## 2024-05-14 DIAGNOSIS — Z90.89 ACQUIRED ABSENCE OF OTHER ORGANS: Chronic | ICD-10-CM

## 2024-05-14 PROCEDURE — 73721 MRI JNT OF LWR EXTRE W/O DYE: CPT | Mod: 26,LT

## 2024-05-17 ENCOUNTER — APPOINTMENT (OUTPATIENT)
Dept: ORTHOPEDIC SURGERY | Facility: CLINIC | Age: 24
End: 2024-05-17
Payer: COMMERCIAL

## 2024-05-17 VITALS — BODY MASS INDEX: 33.34 KG/M2 | HEIGHT: 68 IN | WEIGHT: 220 LBS

## 2024-05-17 DIAGNOSIS — M25.552 PAIN IN LEFT HIP: ICD-10-CM

## 2024-05-17 PROCEDURE — 73521 X-RAY EXAM HIPS BI 2 VIEWS: CPT

## 2024-05-17 PROCEDURE — 99203 OFFICE O/P NEW LOW 30 MIN: CPT

## 2024-05-17 NOTE — PHYSICAL EXAM
[de-identified] : Patient on exam today is full passive internal/external rotation of the left hip when held at 90 degrees no mechanical block restriction or pain.  She does have some mild paraspinal tenderness palpation of left-sided paraspinal lumbar musculature neurovascular tact distally no significant atrophy noted of left lower extremity. [de-identified] : MRI images were directly reviewed showing 3 small loose bodies in the hip joint inferior capsule.  Hip radiographs were ordered today AP and lateral both hips were obtained showing well-preserved joint spaces no evidence of loose bodies on the radiographs no evidence of arthritis AVN or other abnormalities.

## 2024-05-17 NOTE — DISCUSSION/SUMMARY
[de-identified] : Patient I talked at length about the underlying etiology of her current symptoms.  Patient has a clinical exam and history that is also consistent with possible left-sided lumbar radiculopathy.  We will send the patient for an MRI of the lumbar spine to be sure that her symptoms not being produced by radicular issues.  Once this has been established we will refer her to our  for possible arthroscopic surgery for removal of  loose bodies.  This consultation lasted 40 minutes.

## 2024-05-17 NOTE — HISTORY OF PRESENT ILLNESS
[de-identified] : First-time visit for this 24-year-old female she is here with a complaint of chronic intermittent left hip pain.  Patient was evaluated recently by another orthopedic surgeon in [facility].  At that point he was concerned about her hip as she had her for an MRI MRI findings are available for review including the images which indicates some chondral loose bodies in the hip.  Also small labral tear.  Patient states that the pain sometimes extends from her lower back into the lateral aspect of the thigh and down to her knee.  She states the pain is intermittent denies any change in bowel or bladder habits or any obvious weakness or numbness in left lower extremity.

## 2024-05-17 NOTE — REASON FOR VISIT
[Initial Visit] : an initial visit for [Hip Pain] : hip pain [FreeTextEntry2] : left hip pain. had mri done at [facility]. this started intermittently for 1 yr. she had a fall back in june 2023. then the pain started july2023. she was given medication (mobic,tylenol, diclofenac)and pt.

## 2024-05-29 ENCOUNTER — APPOINTMENT (OUTPATIENT)
Dept: ORTHOPEDIC SURGERY | Facility: CLINIC | Age: 24
End: 2024-05-29
Payer: COMMERCIAL

## 2024-05-29 VITALS — BODY MASS INDEX: 33.34 KG/M2 | HEIGHT: 68 IN | WEIGHT: 220 LBS

## 2024-05-29 DIAGNOSIS — M24.052: ICD-10-CM

## 2024-05-29 PROCEDURE — 99203 OFFICE O/P NEW LOW 30 MIN: CPT

## 2024-05-30 PROBLEM — M24.052: Status: ACTIVE | Noted: 2024-05-30

## 2024-05-30 NOTE — ASSESSMENT
[FreeTextEntry1] : Discussed at length with patient history, exam and imaging.  Patient recommended for hip arthroscopy and informed given this procedure I will recommend her to two surgeons who perform this procedure.

## 2024-05-30 NOTE — PHYSICAL EXAM
[de-identified] : Left hip  Constitutional:  The patient is healthy-appearing and in no apparent distress.   Gait: The patient ambulates with a normal gait and no limp.  Cardiovascular System:  There is capillary refill less than 2 seconds.   Skin:  There is no skin abnormalities.  Lumbar Spine; There is no significance malalignment and no tenderness to the paraspinal musculature.  Left Hips:   Bony Palpation:  There is no tenderness of the iliac crest. There is no tenderness of the ASIS. There is no tenderness of the PSIS. There is no tenderness of the SI joint. There is no tenderness of the greater trochanter.   Soft Tissue Palpation:  There is no tenderness of the hip adductors. There is no tenderness of the hip abductors. There is no tenderness of the hamstrings.  There is no tenderness of the piriformis.  There is tenderness of the hip flexors.  Active Range of Motion:  There is full range of motion at the hip actively and passively.  There is no groin pain with hip logroll in seated and supine positioning.  Special Tests:  There is a negative Collin's test. There is a negative Kwasi-Alondra test.   Strength:  There is 5/5 hip flexion, adduction, abduction.    Psychiatric:  The patient demonstrates a normal mood and affect and is active and alert. [de-identified] : MRI left hip ([facility] ): There is superior femoral head chondral wear and multiple loose bodies.

## 2024-05-30 NOTE — HISTORY OF PRESENT ILLNESS
[de-identified] : Initial visit: Left hip pain  Reason: no injury Duration: 10 months  Prior studies: MRI @ [facility] Symptoms: Radiating /   Aggravating: walking  Alleviating: pain med Pain level: 0/10 Pain medication: Tylenol Medical Hx: n/a Surgical Hx: N/a  Current Medication: none  Allergies: NKA

## 2024-07-17 NOTE — ED ADULT NURSE REASSESSMENT NOTE - REASSESS COMMUNICATION
Detail Level: Detailed Was A Bandage Applied: Yes Punch Size In Mm: 4 Size Of Lesion In Cm (Optional): 0 Depth Of Punch Biopsy: dermis Biopsy Type: H and E Anesthesia Type: 1% lidocaine with epinephrine Hemostasis: Ligature PA Epidermal Sutures: 5-0 Fast Absorbing Gut Wound Care: Petrolatum Dressing: bandage Suture Removal: 14 days Patient Will Remove Sutures At Home?: No Lab: [id number] Lab Facility: [id number] Consent: Written consent was obtained and risks were reviewed including but not limited to scarring, infection, bleeding, scabbing, incomplete removal, nerve damage and allergy to anesthesia. Post-Care Instructions: I reviewed with the patient in detail post-care instructions. Patient is to keep the biopsy site dry overnight, and then apply bacitracin twice daily until healed. Patient may apply hydrogen peroxide soaks to remove any crusting. Home Suture Removal Text: Patient was provided a home suture removal kit and will remove their sutures at home.  If they have any questions or difficulties they will call the office. Notification Instructions: Patient will be notified of biopsy results. However, patient instructed to call the office if not contacted within 2 weeks. Billing Type: Third-Party Bill Information: Selecting Yes will display possible errors in your note based on the variables you have selected. This validation is only offered as a suggestion for you. PLEASE NOTE THAT THE VALIDATION TEXT WILL BE REMOVED WHEN YOU FINALIZE YOUR NOTE. IF YOU WANT TO FAX A PRELIMINARY NOTE YOU WILL NEED TO TOGGLE THIS TO 'NO' IF YOU DO NOT WANT IT IN YOUR FAXED NOTE.

## 2024-10-10 ENCOUNTER — APPOINTMENT (OUTPATIENT)
Dept: PEDIATRICS | Facility: CLINIC | Age: 24
End: 2024-10-10

## 2024-10-10 VITALS
HEIGHT: 67.52 IN | WEIGHT: 262.24 LBS | BODY MASS INDEX: 40.21 KG/M2 | TEMPERATURE: 96.6 F | DIASTOLIC BLOOD PRESSURE: 81 MMHG | SYSTOLIC BLOOD PRESSURE: 111 MMHG | HEART RATE: 94 BPM

## 2024-10-10 DIAGNOSIS — K90.9 INTESTINAL MALABSORPTION, UNSPECIFIED: ICD-10-CM

## 2024-10-10 DIAGNOSIS — E66.01 MORBID (SEVERE) OBESITY DUE TO EXCESS CALORIES: ICD-10-CM

## 2024-10-10 DIAGNOSIS — M25.552 PAIN IN LEFT HIP: ICD-10-CM

## 2024-10-14 PROBLEM — E66.01 OBESITY, CLASS III, BMI 40-49.9 (MORBID OBESITY): Status: ACTIVE | Noted: 2024-10-14

## 2024-10-18 RX ORDER — PHENTERMINE AND TOPIRAMATE 3.75; 23 MG/1; MG/1
3.75-23 CAPSULE, EXTENDED RELEASE ORAL DAILY
Qty: 15 | Refills: 0 | Status: ACTIVE | COMMUNITY
Start: 2024-10-10 | End: 1900-01-01

## 2024-10-25 ENCOUNTER — APPOINTMENT (OUTPATIENT)
Dept: PEDIATRICS | Facility: CLINIC | Age: 24
End: 2024-10-25

## 2024-10-25 DIAGNOSIS — E66.01 MORBID (SEVERE) OBESITY DUE TO EXCESS CALORIES: ICD-10-CM

## 2024-10-25 DIAGNOSIS — K90.9 INTESTINAL MALABSORPTION, UNSPECIFIED: ICD-10-CM

## 2024-10-25 RX ORDER — PHENTERMINE AND TOPIRAMATE 7.5; 46 MG/1; MG/1
7.5-46 CAPSULE, EXTENDED RELEASE ORAL
Qty: 30 | Refills: 0 | Status: ACTIVE | COMMUNITY
Start: 2024-10-25 | End: 1900-01-01

## 2024-12-17 ENCOUNTER — EMERGENCY (EMERGENCY)
Facility: HOSPITAL | Age: 24
LOS: 1 days | Discharge: HOME | End: 2024-12-17
Admitting: EMERGENCY MEDICINE
Payer: COMMERCIAL

## 2024-12-17 VITALS
DIASTOLIC BLOOD PRESSURE: 79 MMHG | TEMPERATURE: 99 F | RESPIRATION RATE: 16 BRPM | SYSTOLIC BLOOD PRESSURE: 121 MMHG | HEART RATE: 89 BPM | OXYGEN SATURATION: 98 %

## 2024-12-17 VITALS
TEMPERATURE: 98 F | HEART RATE: 83 BPM | RESPIRATION RATE: 16 BRPM | DIASTOLIC BLOOD PRESSURE: 85 MMHG | WEIGHT: 229.94 LBS | SYSTOLIC BLOOD PRESSURE: 116 MMHG | OXYGEN SATURATION: 96 % | HEIGHT: 68 IN

## 2024-12-17 DIAGNOSIS — Z98.890 OTHER SPECIFIED POSTPROCEDURAL STATES: Chronic | ICD-10-CM

## 2024-12-17 DIAGNOSIS — R05.1 ACUTE COUGH: ICD-10-CM

## 2024-12-17 DIAGNOSIS — M79.7 FIBROMYALGIA: ICD-10-CM

## 2024-12-17 DIAGNOSIS — Z90.89 ACQUIRED ABSENCE OF OTHER ORGANS: Chronic | ICD-10-CM

## 2024-12-17 DIAGNOSIS — Z98.890 OTHER SPECIFIED POSTPROCEDURAL STATES: ICD-10-CM

## 2024-12-17 DIAGNOSIS — J06.9 ACUTE UPPER RESPIRATORY INFECTION, UNSPECIFIED: ICD-10-CM

## 2024-12-17 DIAGNOSIS — B97.89 OTHER VIRAL AGENTS AS THE CAUSE OF DISEASES CLASSIFIED ELSEWHERE: ICD-10-CM

## 2024-12-17 LAB
FLUAV AG NPH QL: SIGNIFICANT CHANGE UP
FLUBV AG NPH QL: SIGNIFICANT CHANGE UP
RSV RNA NPH QL NAA+NON-PROBE: DETECTED
S PYO AG SPEC QL IA: NEGATIVE — SIGNIFICANT CHANGE UP
SARS-COV-2 RNA SPEC QL NAA+PROBE: SIGNIFICANT CHANGE UP

## 2024-12-17 PROCEDURE — 99284 EMERGENCY DEPT VISIT MOD MDM: CPT

## 2024-12-17 PROCEDURE — 71046 X-RAY EXAM CHEST 2 VIEWS: CPT | Mod: 26

## 2024-12-17 RX ORDER — ACETAMINOPHEN 500MG 500 MG/1
650 TABLET, COATED ORAL ONCE
Refills: 0 | Status: COMPLETED | OUTPATIENT
Start: 2024-12-17 | End: 2024-12-17

## 2024-12-17 RX ORDER — BENZONATATE 100 MG/1
1 CAPSULE ORAL
Qty: 21 | Refills: 0
Start: 2024-12-17 | End: 2024-12-23

## 2024-12-17 RX ADMIN — ACETAMINOPHEN 500MG 650 MILLIGRAM(S): 500 TABLET, COATED ORAL at 20:30

## 2024-12-17 NOTE — ED ADULT TRIAGE NOTE - CHIEF COMPLAINT QUOTE
Pt states cough, nasal/ ear congestion, fatigue x 1 wk. hx Fibromyalgia, gallbladder removed, hip surgery. venous sinus stent

## 2024-12-17 NOTE — ED PROVIDER NOTE - PATIENT PORTAL LINK FT
You can access the FollowMyHealth Patient Portal offered by [facility] by registering at the following website: [email or web address]. By joining [employer]’s FollowMyHealth portal, you will also be able to view your health information using other applications (apps) compatible with our system.

## 2024-12-17 NOTE — ED PROVIDER NOTE - OBJECTIVE STATEMENT
23 yo F, pmh of fibromyalgia  X yo X, pmh of X, presents to this ED with fever, cough, congestion, runny nose x 3 days. Fever high - X. Has /not tried any medications for symptomatic relief. Cough is dry/ productive. Mucus is X color. Is tolerating food and liquids well. No changes to urinary patterns or bowel movement patterns. Denies n/v/d, constipation, abd pain, neck stiffness, SOB, CP. Vaccines UTD, sees pediatrician regulary. Tobacco use? 25 yo F, pmh of fibromyalgia,Presents this emergency department with cough congestion runny nose over the past week.  Presents with her sister today, who was sick before her and she believes she caught one of her sister had.  Her sister went to urgent care, tested negative for COVID and the flu.  States that she did have a fever at the beginning, however that has gone away.  Has not tried any medications presents with relief, however has tried humidifiers which has helped with the coughing.  Cough is worse at night. Is tolerating food and liquids well. No changes to urinary patterns or bowel movement patterns. Denies n/v/d, constipation, abd pain, neck stiffness, SOB, CP. Vaccines UTD, sees pediatrician regularly.

## 2024-12-17 NOTE — ED PROVIDER NOTE - CLINICAL SUMMARY MEDICAL DECISION MAKING FREE TEXT BOX
23 yo F presents to this ED for uri  According to [facility] rules - pt is low risk for PE  According to Well's Criteria - Pt is very low risk for DVT  Centor Criteria not met - bacterial pharyngitis unlikely  No voice changes, unilateral tonsillar swelling - retropharyngeal/ peritonsillar abscess unlikely  No overt signs of dehydration noted, mucus membranes moist, no skin tenting present, cap refill <2 sec, VSS.    Patient sitting comfortably in room no acute distress.  Patient is RSV positive, flu and COVID-negative  Chest x-ray is unremarkable  Patient stable for discharge  Results with patient.  Patient presents [facility] plan.  [facility] primary care doctor in 2 to 3 days.

## 2024-12-17 NOTE — ED ADULT NURSE NOTE - OBJECTIVE STATEMENT
Pt received from triage c/o flu-like symptoms x 1 week. Resting in chair and breathing normally on room air. MD swabbed for flue and strep and collection sent to lab. Awaiting results and MD orders.

## 2024-12-29 NOTE — ASSESSMENT
"Chief Complaint: Well Woman Exam     HPI:      Deanna St is a 30 y.o.  who presents today for well woman exam.  LMP: Patient's last menstrual period was 10/21/2024 (approximate).  No issues, problems, or complaints. Specifically, patient denies abnormal vaginal bleeding, discharge, pelvic pain, urinary problems, or changes in appetite. Ms. St is currently sexually active with a single male partner. She declines STD screening today. Patient has regular monthly menses. Patient's last menstrual period was 10/21/2024 (approximate). She is currently using oral contraceptives (estrogen/progesterone) for contraception.    Previous Pap: no abnormalities  (  Gardasil: Completed     OB History          0    Para   0    Term   0       0    AB   0    Living   0         SAB   0    IAB   0    Ectopic   0    Multiple   0    Live Births                     GYN History  Age of Menarche:13  Age at first pregnancy:    Age at first live birth:   Number of months breastfeeding:    Age at Menopause:    Comments: No abnormal or STDs       ROS:     GENERAL: Denies unintentional weight gain or weight loss. Feeling well overall.   SKIN: Denies rash or lesions.   HEENT: Denies headaches, or vision changes.   CARDIOVASCULAR: Denies palpitations or chest pain.   RESPIRATORY: Denies shortness of breath or dyspnea on exertion.  BREASTS: Denies pain, lumps, or nipple discharge.   ABDOMEN: Denies abdominal pain, constipation, diarrhea, nausea, vomiting, change in appetite.  URINARY: Denies frequency, dysuria, hematuria.  NEUROLOGIC: Denies syncope or weakness.   PSYCHIATRIC: Denies depression, anxiety or mood swings.    Physical Exam:      PHYSICAL EXAM:  /78 (BP Location: Left arm, Patient Position: Sitting)   Ht 5' 4" (1.626 m)   Wt 61.2 kg (134 lb 14.7 oz)   LMP 10/21/2024 (Approximate)   BMI 23.16 kg/m²   Body mass index is 23.16 kg/m².     APPEARANCE: Well nourished, well developed, in no " [FreeTextEntry1] : FORMULATION\par IIH with persistent severe headaches and intolerance to medical management\par Venous Sinus stenosis on MRV; good candidate for venous sinus stenting\par \par IMPRESSION\par Ms. ONIEL ARBOLEDA suffers from IIH with failure/ intolerance of medical management. She has the classic appearance of lateral venous sinus stenosis on imaging and based on that and her symptoms, I recommended venous sinus stenting as an appropriate minimally invasive surgical treatment. I discussed with the patient my extensive personal experience with venous sinus stenting for IIH and we also discussed the recent literature that supports venous stenting as an alternative less invasive and cheaper option to CSF shunt. Stenting also has better outcomes in carefully selected patients. \par \par We discussed the procedure that has two components. The first part consists of catheter angiogram and manometry to assess the degree of stenosis and the trans-stenotic gradient. This part is typically performed with the patient awake. The second part consists of the stenting part and is performed under general anesthesia.\par \par The risks, benefits and alternatives of the procedure were discussed with the patient in detail. In my personal experience, the risks are very rare, but the possibility is not zero. Risks include stroke, brain hemorrhage, any type of disability (facial or extremity weakness, facial or extremity numbness, speech difficulties, blindness) and death. There are also possible complications related to the incisions such as infection, pain, swelling and bleeding.\par \par The patient is aware that venous sinus stenting requires dual antiplatelet therapy for 1-month post-stent (typically aspirin 325 mg daily and clopidogrel 75 mg daily) and antiplatelet monotherapy (typically aspirin 325 md daily) for additional 11 months post-stent.\par \par PLAN\par Venous Sinus Stenting acute distress.  PSYCH: Appropriate mood and affect.  SKIN: No acne or hirsutism  NECK: Neck symmetric without masses or thyromegaly  NODES: No inguinal, axillary, or supraclavicular lymph node enlargement  ABDOMEN: Soft.  No tenderness or masses.    CARDIOVASCULAR: No edema of peripheral extremities  BREASTS: Symmetrical, no skin changes or visible lesions.  No palpable masses or nipple discharge bilaterally.  PELVIC: Normal external genitalia without lesions.  Normal hair distribution.  Adequate perineal body, normal urethral meatus.  Vagina moist and well rugated without lesions or discharge.  Cervix pink, without lesions, discharge or tenderness.  No significant cystocele or rectocele.  Bimanual exam shows uterus to be normal size, regular, mobile and nontender.  Adnexa without masses or tenderness.      Assessment/Plan:     Encounter for annual routine gynecological examination    Screening for cervical cancer  -     Liquid-Based Pap Smear, Screening    Screening for HPV (human papillomavirus)  -     HPV High Risk Genotypes, PCR    Encounter for surveillance of contraceptive pills  -     noreth-ethinyl estradioL-iron (KAITLIB FE) 0.8mg-25mcg(24) and 75 mg (4) Chew; Take 1 tablet by mouth once daily.  Dispense: 84 tablet; Refill: 3      RTC 1 year    Counseling:     Patient was counseled today on current ASCCP pap guidelines, the recommendation for yearly pelvic exams, healthy diet and exercise routines, breast self awareness.She is to see her PCP for other health maintenance.     Use of the [facility] Patient Portal discussed and encouraged during today's visit.       Domenica Hendrickson MD      As of April 1, 2021, the Cures Act has been passed nationally. This new law requires that all doctors progress notes, lab results, pathology reports and radiology reports be released IMMEDIATELY to the patient in the patient portal. That means that the results are released to you at the EXACT same time they are released to me.  Therefore, with all of the patients that I have I am not able to reply to each patient exactly when the results come in. So there will be a delay from when you see the results to when I see them and have time to come up with a response to send you. Also I only see these results when I am on the computer at work. So if the results come in over the weekend or after 5 pm of a work day, I will not see them until the next business day. As you can tell, this is a challenge as a physician to give every patient the quick response they hope for and deserve. So please be patient! Thanks for understanding, Dr. Hendrickson

## 2025-03-21 ENCOUNTER — APPOINTMENT (OUTPATIENT)
Dept: OPHTHALMOLOGY | Facility: CLINIC | Age: 25
End: 2025-03-21

## 2025-03-28 NOTE — ASU DISCHARGE PLAN (ADULT/PEDIATRIC) - CARE COORDINATION DISCHARGE PLANNING
[facility] ED Follow up Call    Reason for ED visit:  Afib, pneumonia          Hi María , this is vionche from Radha Cole's office, just calling to see how you are doing after your recent ED visit.    Did you receive discharge instructions?  Yes  Do you understand the discharge instructions? Yes  Did the ED give you any new prescriptions? No:   Were you able to fill your prescriptions? Yes      Do you have one of our red, yellow and green  Zone sheets that help you to determine when you should go to the ED?No      Do you need or want to make a follow up appt with your PCP?yes    Do you have any further needs in the home i.e. Equipment?  No        FU appts/Provider:    Future Appointments   Date Time Provider Department Center   4/11/2025  9:00 AM Angel Bañuelos APRN - CNP [facility]   4/22/2025 10:20 AM Ford Samano MD [facility]   5/20/2025  9:30 AM Radha Vallecillo MD [facility]   5/22/2025  2:45 PM [facility]       VOICEMAIL DOCUMENTATION - ERASE IF NOT USED  Hi, this message is for maría  This is vionche from Radha Fan office. Just calling to see how you are doing after your recent visit to the Emergency Room. Radha Fan wants to make sure you were able to fill any prescriptions and that you understand your discharge instructions. Please return our call if you need to make a follow up appointment with your provider or have any further needs.   Our phone number is [phone]*. Have a great day.        
Noted. Thank you!    Future Appointments   Date Time Provider Department Center   4/11/2025  9:00 AM Angel Bañuelos APRN - CNP [facility]   4/22/2025 10:20 AM Ford Samano MD [facility]   5/20/2025  9:30 AM Radha Vallecillo MD [facility]   5/22/2025  2:45 PM [facility] Gabriel[facility]       
No

## 2025-09-26 PROBLEM — R07.9 CHEST PAIN, UNSPECIFIED TYPE: Status: ACTIVE | Noted: 2025-09-26
